# Patient Record
Sex: FEMALE | Race: WHITE | Employment: FULL TIME | URBAN - METROPOLITAN AREA
[De-identification: names, ages, dates, MRNs, and addresses within clinical notes are randomized per-mention and may not be internally consistent; named-entity substitution may affect disease eponyms.]

---

## 2017-03-22 RX ORDER — FLUOXETINE HYDROCHLORIDE 20 MG/1
20 CAPSULE ORAL DAILY
COMMUNITY

## 2017-03-29 ENCOUNTER — HOSPITAL ENCOUNTER (OUTPATIENT)
Dept: PREADMISSION TESTING | Age: 52
Discharge: HOME | End: 2017-03-29
Payer: COMMERCIAL

## 2017-03-29 LAB
ABO + RH BLD: NORMAL
ALBUMIN SERPL BCP-MCNC: 3.7 G/DL (ref 3.4–5)
ALBUMIN/GLOB SERPL: 1.2 {RATIO} (ref 0.8–1.7)
ALP SERPL-CCNC: 63 U/L (ref 45–117)
ALT SERPL-CCNC: 13 U/L (ref 13–56)
ANION GAP BLD CALC-SCNC: 7 MMOL/L (ref 3–18)
AST SERPL W P-5'-P-CCNC: 11 U/L (ref 15–37)
BASOPHILS # BLD AUTO: 0 K/UL (ref 0–0.1)
BASOPHILS # BLD: 0 % (ref 0–2)
BILIRUB SERPL-MCNC: 0.3 MG/DL (ref 0.2–1)
BLOOD GROUP ANTIBODIES SERPL: NORMAL
BUN SERPL-MCNC: 20 MG/DL (ref 7–18)
BUN/CREAT SERPL: 30 (ref 12–20)
CALCIUM SERPL-MCNC: 9.4 MG/DL (ref 8.5–10.1)
CHLORIDE SERPL-SCNC: 104 MMOL/L (ref 100–108)
CO2 SERPL-SCNC: 29 MMOL/L (ref 21–32)
CREAT SERPL-MCNC: 0.67 MG/DL (ref 0.6–1.3)
DIFFERENTIAL METHOD BLD: NORMAL
EOSINOPHIL # BLD: 0.1 K/UL (ref 0–0.4)
EOSINOPHIL NFR BLD: 3 % (ref 0–5)
ERYTHROCYTE [DISTWIDTH] IN BLOOD BY AUTOMATED COUNT: 12.5 % (ref 11.6–14.5)
GLOBULIN SER CALC-MCNC: 3 G/DL (ref 2–4)
GLUCOSE SERPL-MCNC: 79 MG/DL (ref 74–99)
HCG SERPL-ACNC: 2 MIU/ML (ref 0–10)
HCT VFR BLD AUTO: 40.8 % (ref 35–45)
HGB BLD-MCNC: 13 G/DL (ref 12–16)
LYMPHOCYTES # BLD AUTO: 35 % (ref 21–52)
LYMPHOCYTES # BLD: 1.8 K/UL (ref 0.9–3.6)
MCH RBC QN AUTO: 29.5 PG (ref 24–34)
MCHC RBC AUTO-ENTMCNC: 31.9 G/DL (ref 31–37)
MCV RBC AUTO: 92.7 FL (ref 74–97)
MONOCYTES # BLD: 0.5 K/UL (ref 0.05–1.2)
MONOCYTES NFR BLD AUTO: 10 % (ref 3–10)
NEUTS SEG # BLD: 2.7 K/UL (ref 1.8–8)
NEUTS SEG NFR BLD AUTO: 52 % (ref 40–73)
PLATELET # BLD AUTO: 191 K/UL (ref 135–420)
PMV BLD AUTO: 11.7 FL (ref 9.2–11.8)
POTASSIUM SERPL-SCNC: 4.2 MMOL/L (ref 3.5–5.5)
PROT SERPL-MCNC: 6.7 G/DL (ref 6.4–8.2)
RBC # BLD AUTO: 4.4 M/UL (ref 4.2–5.3)
SODIUM SERPL-SCNC: 140 MMOL/L (ref 136–145)
SPECIMEN EXP DATE BLD: NORMAL
WBC # BLD AUTO: 5.1 K/UL (ref 4.6–13.2)

## 2017-03-29 PROCEDURE — 86900 BLOOD TYPING SEROLOGIC ABO: CPT | Performed by: OBSTETRICS & GYNECOLOGY

## 2017-03-29 PROCEDURE — 84702 CHORIONIC GONADOTROPIN TEST: CPT | Performed by: OBSTETRICS & GYNECOLOGY

## 2017-03-29 PROCEDURE — 80053 COMPREHEN METABOLIC PANEL: CPT | Performed by: OBSTETRICS & GYNECOLOGY

## 2017-03-29 PROCEDURE — 85025 COMPLETE CBC W/AUTO DIFF WBC: CPT | Performed by: OBSTETRICS & GYNECOLOGY

## 2017-03-29 PROCEDURE — 36415 COLL VENOUS BLD VENIPUNCTURE: CPT | Performed by: OBSTETRICS & GYNECOLOGY

## 2017-04-03 ENCOUNTER — ANESTHESIA EVENT (OUTPATIENT)
Dept: SURGERY | Age: 52
End: 2017-04-03
Payer: COMMERCIAL

## 2017-04-03 NOTE — H&P
Gynecology History and Physical    Name: Sudhakar Hopson MRN: [id number] [id number]: [id number]    YOB: 1965  Age: 46 y.o. Sex: female       Subjective:      Chief complaint:   RLQ pain    Torrey Bryant is a 46 y.o.  female with a history of RLQ pain. Her US done on 17 reveals normal uterus and EM stripe at 3.8mm.  2.7 cm simple left ovarian cyst, 1.5 cm nabothian cyst and a 1 cm vascular lesion anterior to the uterus. This most likely represents pelvic congestion syndrome. . Previous treatment measures included laparoscopy. She is admitted for Procedure(s) (LRB):  TOTAL LAPAROSCOPIC HYSTERECTOMY/BILATERAL SALPINGECTOMY (N/A). OB History     No data available        Past Medical History:   Diagnosis Date    Asthma     Cancer ([facility] [address]     skin ca removed    Fibromyalgia     GERD (gastroesophageal reflux disease)     Hypothyroid     Mitral valve prolapse     Psychiatric disorder     depression     Past Surgical History:   Procedure Laterality Date    HX APPENDECTOMY      HX GYN       x2    HX OTHER SURGICAL      lysis adhesions    HX OTHER SURGICAL  2009    ablation of uterus     Social History     Occupational History    Not on file. Social History Main Topics    Smoking status: Never Smoker    Smokeless tobacco: Not on file    Alcohol use Yes      Comment: occassionally    Drug use: No    Sexual activity: Not on file     Family History   Problem Relation Age of Onset    Cancer Sister     Cancer Maternal Aunt     Cancer Maternal Grandmother     Cancer Paternal Grandmother         Allergies   Allergen Reactions    Codeine Hives and Nausea Only    Sulfa (Sulfonamide Antibiotics) Hives     Prior to Admission medications    Medication Sig Start Date End Date Taking? Authorizing Provider   LEVOTHYROXINE SODIUM (LEVOTHYROXINE PO) Take  by mouth daily. Yes Historical Provider   FLUoxetine (PROZAC) 20 mg capsule Take 20 mg by mouth daily.    Yes Historical Provider traZODone (DESYREL) 50 mg tablet Take  by mouth nightly. Yes Historical Provider   ranitidine (ZANTAC) 150 mg tablet Take 150 mg by mouth daily. Yes Historical Provider        Review of Systems:  A comprehensive review of systems was negative. Objective:     Vitals:    03/22/17 1438   Weight: 81.6 kg (180 lb)   Height: 5' 3\" (1.6 m)       Physical Exam:  Heart: Regular rate and rhythm or without murmur or extra heart sounds  Lung: clear to auscultation throughout lung fields, no wheezes, no rales, no rhonchi and normal respiratory effort  Abdomen: soft, nontender  External Genitalia: normal general appearance  Vagina: normal mucosa without prolapse or lesions  Cervix: normal appearance  Adnexa: normal bimanual exam  Uterus: normal single, nontender    Assessment:     Chronic RLQ pain with vascular lesion anterior to the uterus     Plan:     Procedure(s) (LRB):  TOTAL LAPAROSCOPIC HYSTERECTOMY/BILATERAL SALPINGECTOMY (N/A)  Discussed the risks of surgery including the risks of bleeding, infection, deep vein thrombosis, and surgical injuries to internal organs including but not limited to the bowels, bladder, rectum, and female reproductive organs. The patient understands the risks; any and all questions were answered to the patient's satisfaction.     Signed By:  Yris Roe MD     April 3, 2017

## 2017-04-04 ENCOUNTER — ANESTHESIA (OUTPATIENT)
Dept: SURGERY | Age: 52
End: 2017-04-04
Payer: COMMERCIAL

## 2017-04-04 ENCOUNTER — HOSPITAL ENCOUNTER (OUTPATIENT)
Age: 52
Setting detail: OBSERVATION
Discharge: HOME | End: 2017-04-04
Attending: OBSTETRICS & GYNECOLOGY | Admitting: OBSTETRICS & GYNECOLOGY
Payer: COMMERCIAL

## 2017-04-04 ENCOUNTER — SURGERY (OUTPATIENT)
Age: 52
End: 2017-04-04

## 2017-04-04 VITALS
TEMPERATURE: 98.3 F | DIASTOLIC BLOOD PRESSURE: 78 MMHG | SYSTOLIC BLOOD PRESSURE: 119 MMHG | RESPIRATION RATE: 16 BRPM | HEART RATE: 85 BPM | HEIGHT: 63 IN | BODY MASS INDEX: 31.01 KG/M2 | WEIGHT: 175 LBS | OXYGEN SATURATION: 98 %

## 2017-04-04 LAB — HCG UR QL: NEGATIVE

## 2017-04-04 PROCEDURE — 77030002882 HC SUT CART KNOT LSIS -B: Performed by: OBSTETRICS & GYNECOLOGY

## 2017-04-04 PROCEDURE — 99218 HC RM OBSERVATION: CPT

## 2017-04-04 PROCEDURE — 77030034850: Performed by: OBSTETRICS & GYNECOLOGY

## 2017-04-04 PROCEDURE — 77030013288 HC BLN OCCL PERITNM COOP -B: Performed by: OBSTETRICS & GYNECOLOGY

## 2017-04-04 PROCEDURE — 77030011640 HC PAD GRND REM COVD -A: Performed by: OBSTETRICS & GYNECOLOGY

## 2017-04-04 PROCEDURE — 77030008023 HC RELD SUT ENDOSC COVD -B: Performed by: OBSTETRICS & GYNECOLOGY

## 2017-04-04 PROCEDURE — 74011000250 HC RX REV CODE- 250: Performed by: OBSTETRICS & GYNECOLOGY

## 2017-04-04 PROCEDURE — 77030035029 HC NDL INSUF VERES DISP COVD -B: Performed by: OBSTETRICS & GYNECOLOGY

## 2017-04-04 PROCEDURE — 76010000131 HC OR TIME 2 TO 2.5 HR: Performed by: OBSTETRICS & GYNECOLOGY

## 2017-04-04 PROCEDURE — 77030009957 HC RELD ENDOSTCH COVD -C: Performed by: OBSTETRICS & GYNECOLOGY

## 2017-04-04 PROCEDURE — 74011250636 HC RX REV CODE- 250/636

## 2017-04-04 PROCEDURE — 77030034628 HC LIGASURE LAP SEAL DIV MD COVD -F: Performed by: OBSTETRICS & GYNECOLOGY

## 2017-04-04 PROCEDURE — 74011000272 HC RX REV CODE- 272: Performed by: OBSTETRICS & GYNECOLOGY

## 2017-04-04 PROCEDURE — 77030010031 HC SCIS ENDOSC MPLR J&J -C: Performed by: OBSTETRICS & GYNECOLOGY

## 2017-04-04 PROCEDURE — 77030002993 HC SUT RELD STP LSIS -B: Performed by: OBSTETRICS & GYNECOLOGY

## 2017-04-04 PROCEDURE — 77030032490 HC SLV COMPR SCD KNE COVD -B: Performed by: OBSTETRICS & GYNECOLOGY

## 2017-04-04 PROCEDURE — 77030002933 HC SUT MCRYL J&J -A: Performed by: OBSTETRICS & GYNECOLOGY

## 2017-04-04 PROCEDURE — 74011250636 HC RX REV CODE- 250/636: Performed by: NURSE ANESTHETIST, CERTIFIED REGISTERED

## 2017-04-04 PROCEDURE — 77030011220 HC DEV ELECSURG COVD -B: Performed by: OBSTETRICS & GYNECOLOGY

## 2017-04-04 PROCEDURE — 88307 TISSUE EXAM BY PATHOLOGIST: CPT | Performed by: OBSTETRICS & GYNECOLOGY

## 2017-04-04 PROCEDURE — 77030018836 HC SOL IRR NACL ICUM -A: Performed by: OBSTETRICS & GYNECOLOGY

## 2017-04-04 PROCEDURE — 77030003679 HC NDL SPN TERU -A: Performed by: OBSTETRICS & GYNECOLOGY

## 2017-04-04 PROCEDURE — 77030009848 HC PASSR SUT SET COOP -C: Performed by: OBSTETRICS & GYNECOLOGY

## 2017-04-04 PROCEDURE — 76210000016 HC OR PH I REC 1 TO 1.5 HR: Performed by: OBSTETRICS & GYNECOLOGY

## 2017-04-04 PROCEDURE — 77030018834: Performed by: OBSTETRICS & GYNECOLOGY

## 2017-04-04 PROCEDURE — 74011250636 HC RX REV CODE- 250/636: Performed by: OBSTETRICS & GYNECOLOGY

## 2017-04-04 PROCEDURE — 74011250637 HC RX REV CODE- 250/637: Performed by: OBSTETRICS & GYNECOLOGY

## 2017-04-04 PROCEDURE — 77030035045 HC TRCR ENDOSC VRSPRT BLDLSS COVD -B: Performed by: OBSTETRICS & GYNECOLOGY

## 2017-04-04 PROCEDURE — 77030008756 HC TU IRR SUC STRY -B: Performed by: OBSTETRICS & GYNECOLOGY

## 2017-04-04 PROCEDURE — 77030020053 HC ELECTRD LAPSCP COVD -B: Performed by: OBSTETRICS & GYNECOLOGY

## 2017-04-04 PROCEDURE — 81025 URINE PREGNANCY TEST: CPT

## 2017-04-04 PROCEDURE — 74011000250 HC RX REV CODE- 250

## 2017-04-04 PROCEDURE — 76060000035 HC ANESTHESIA 2 TO 2.5 HR: Performed by: OBSTETRICS & GYNECOLOGY

## 2017-04-04 PROCEDURE — 77030003666 HC NDL SPINAL BD -A: Performed by: OBSTETRICS & GYNECOLOGY

## 2017-04-04 PROCEDURE — 77030020782 HC GWN BAIR PAWS FLX 3M -B: Performed by: OBSTETRICS & GYNECOLOGY

## 2017-04-04 PROCEDURE — 77030032230 HC DSCTR ULTSONC CRDLSS COVD -E: Performed by: OBSTETRICS & GYNECOLOGY

## 2017-04-04 PROCEDURE — 77030031139 HC SUT VCRL2 J&J -A: Performed by: OBSTETRICS & GYNECOLOGY

## 2017-04-04 PROCEDURE — 77030014063 HC TIP MANIP UTER COOP -B: Performed by: OBSTETRICS & GYNECOLOGY

## 2017-04-04 PROCEDURE — 77030027138 HC INCENT SPIROMETER -A

## 2017-04-04 PROCEDURE — 74011250637 HC RX REV CODE- 250/637: Performed by: NURSE ANESTHETIST, CERTIFIED REGISTERED

## 2017-04-04 PROCEDURE — 77030002903 HC SUT DEV PLCMNT LSIS -C: Performed by: OBSTETRICS & GYNECOLOGY

## 2017-04-04 RX ORDER — SODIUM CHLORIDE 0.9 % (FLUSH) 0.9 %
5-10 SYRINGE (ML) INJECTION EVERY 8 HOURS
Status: DISCONTINUED | OUTPATIENT
Start: 2017-04-04 | End: 2017-04-04 | Stop reason: SDUPTHER

## 2017-04-04 RX ORDER — MIDAZOLAM HYDROCHLORIDE 1 MG/ML
INJECTION, SOLUTION INTRAMUSCULAR; INTRAVENOUS AS NEEDED
Status: DISCONTINUED | OUTPATIENT
Start: 2017-04-04 | End: 2017-04-04 | Stop reason: HOSPADM

## 2017-04-04 RX ORDER — HYDROMORPHONE HYDROCHLORIDE 2 MG/ML
1 INJECTION, SOLUTION INTRAMUSCULAR; INTRAVENOUS; SUBCUTANEOUS
Status: DISCONTINUED | OUTPATIENT
Start: 2017-04-04 | End: 2017-04-04 | Stop reason: HOSPADM

## 2017-04-04 RX ORDER — BUPIVACAINE HYDROCHLORIDE AND EPINEPHRINE 2.5; 5 MG/ML; UG/ML
INJECTION, SOLUTION EPIDURAL; INFILTRATION; INTRACAUDAL; PERINEURAL AS NEEDED
Status: DISCONTINUED | OUTPATIENT
Start: 2017-04-04 | End: 2017-04-04 | Stop reason: HOSPADM

## 2017-04-04 RX ORDER — ONDANSETRON 2 MG/ML
INJECTION INTRAMUSCULAR; INTRAVENOUS AS NEEDED
Status: DISCONTINUED | OUTPATIENT
Start: 2017-04-04 | End: 2017-04-04 | Stop reason: HOSPADM

## 2017-04-04 RX ORDER — SODIUM CHLORIDE 0.9 % (FLUSH) 0.9 %
5-10 SYRINGE (ML) INJECTION AS NEEDED
Status: DISCONTINUED | OUTPATIENT
Start: 2017-04-04 | End: 2017-04-04 | Stop reason: SDUPTHER

## 2017-04-04 RX ORDER — DIPHENHYDRAMINE HCL 25 MG
25 CAPSULE ORAL
Status: DISCONTINUED | OUTPATIENT
Start: 2017-04-04 | End: 2017-04-04 | Stop reason: HOSPADM

## 2017-04-04 RX ORDER — SODIUM CHLORIDE, SODIUM LACTATE, POTASSIUM CHLORIDE, CALCIUM CHLORIDE 600; 310; 30; 20 MG/100ML; MG/100ML; MG/100ML; MG/100ML
50 INJECTION, SOLUTION INTRAVENOUS CONTINUOUS
Status: DISCONTINUED | OUTPATIENT
Start: 2017-04-04 | End: 2017-04-04 | Stop reason: SDUPTHER

## 2017-04-04 RX ORDER — PROPOFOL 10 MG/ML
INJECTION, EMULSION INTRAVENOUS AS NEEDED
Status: DISCONTINUED | OUTPATIENT
Start: 2017-04-04 | End: 2017-04-04 | Stop reason: HOSPADM

## 2017-04-04 RX ORDER — OXYCODONE AND ACETAMINOPHEN 5; 325 MG/1; MG/1
1 TABLET ORAL
Status: DISCONTINUED | OUTPATIENT
Start: 2017-04-04 | End: 2017-04-04 | Stop reason: HOSPADM

## 2017-04-04 RX ORDER — FAMOTIDINE 20 MG/1
20 TABLET, FILM COATED ORAL ONCE
Status: DISCONTINUED | OUTPATIENT
Start: 2017-04-04 | End: 2017-04-04 | Stop reason: SDUPTHER

## 2017-04-04 RX ORDER — LIDOCAINE HYDROCHLORIDE 10 MG/ML
0.1 INJECTION, SOLUTION EPIDURAL; INFILTRATION; INTRACAUDAL; PERINEURAL AS NEEDED
Status: DISCONTINUED | OUTPATIENT
Start: 2017-04-04 | End: 2017-04-04 | Stop reason: SDUPTHER

## 2017-04-04 RX ORDER — SODIUM CHLORIDE 0.9 % (FLUSH) 0.9 %
5-10 SYRINGE (ML) INJECTION AS NEEDED
Status: DISCONTINUED | OUTPATIENT
Start: 2017-04-04 | End: 2017-04-04 | Stop reason: HOSPADM

## 2017-04-04 RX ORDER — DEXAMETHASONE SODIUM PHOSPHATE 4 MG/ML
INJECTION, SOLUTION INTRA-ARTICULAR; INTRALESIONAL; INTRAMUSCULAR; INTRAVENOUS; SOFT TISSUE AS NEEDED
Status: DISCONTINUED | OUTPATIENT
Start: 2017-04-04 | End: 2017-04-04 | Stop reason: HOSPADM

## 2017-04-04 RX ORDER — FAMOTIDINE 20 MG/1
20 TABLET, FILM COATED ORAL ONCE
Status: COMPLETED | OUTPATIENT
Start: 2017-04-04 | End: 2017-04-04

## 2017-04-04 RX ORDER — SODIUM CHLORIDE, SODIUM LACTATE, POTASSIUM CHLORIDE, CALCIUM CHLORIDE 600; 310; 30; 20 MG/100ML; MG/100ML; MG/100ML; MG/100ML
125 INJECTION, SOLUTION INTRAVENOUS CONTINUOUS
Status: DISCONTINUED | OUTPATIENT
Start: 2017-04-04 | End: 2017-04-04 | Stop reason: HOSPADM

## 2017-04-04 RX ORDER — ONDANSETRON 2 MG/ML
4 INJECTION INTRAMUSCULAR; INTRAVENOUS ONCE
Status: COMPLETED | OUTPATIENT
Start: 2017-04-04 | End: 2017-04-04

## 2017-04-04 RX ORDER — SODIUM CHLORIDE 0.9 % (FLUSH) 0.9 %
5-10 SYRINGE (ML) INJECTION EVERY 8 HOURS
Status: DISCONTINUED | OUTPATIENT
Start: 2017-04-04 | End: 2017-04-04 | Stop reason: HOSPADM

## 2017-04-04 RX ORDER — DIPHENHYDRAMINE HYDROCHLORIDE 50 MG/ML
12.5 INJECTION, SOLUTION INTRAMUSCULAR; INTRAVENOUS
Status: DISCONTINUED | OUTPATIENT
Start: 2017-04-04 | End: 2017-04-04 | Stop reason: HOSPADM

## 2017-04-04 RX ORDER — IBUPROFEN 800 MG/1
800 TABLET ORAL
Qty: 60 TAB | Refills: 0 | Status: SHIPPED
Start: 2017-04-04 | End: 2018-07-27 | Stop reason: SDUPTHER

## 2017-04-04 RX ORDER — OXYCODONE AND ACETAMINOPHEN 5; 325 MG/1; MG/1
1 TABLET ORAL
Qty: 20 TAB | Refills: 0 | Status: SHIPPED
Start: 2017-04-04 | End: 2018-08-22

## 2017-04-04 RX ORDER — FENTANYL CITRATE 50 UG/ML
INJECTION, SOLUTION INTRAMUSCULAR; INTRAVENOUS AS NEEDED
Status: DISCONTINUED | OUTPATIENT
Start: 2017-04-04 | End: 2017-04-04 | Stop reason: HOSPADM

## 2017-04-04 RX ORDER — MORPHINE SULFATE 2 MG/ML
2 INJECTION, SOLUTION INTRAMUSCULAR; INTRAVENOUS
Status: DISCONTINUED | OUTPATIENT
Start: 2017-04-04 | End: 2017-04-04 | Stop reason: HOSPADM

## 2017-04-04 RX ORDER — KETOROLAC TROMETHAMINE 30 MG/ML
30 INJECTION, SOLUTION INTRAMUSCULAR; INTRAVENOUS
Status: COMPLETED | OUTPATIENT
Start: 2017-04-04 | End: 2017-04-04

## 2017-04-04 RX ORDER — DEXTROSE 50 % IN WATER (D50W) INTRAVENOUS SYRINGE
25-50 AS NEEDED
Status: DISCONTINUED | OUTPATIENT
Start: 2017-04-04 | End: 2017-04-04 | Stop reason: HOSPADM

## 2017-04-04 RX ORDER — GLYCOPYRROLATE 0.2 MG/ML
INJECTION INTRAMUSCULAR; INTRAVENOUS AS NEEDED
Status: DISCONTINUED | OUTPATIENT
Start: 2017-04-04 | End: 2017-04-04 | Stop reason: HOSPADM

## 2017-04-04 RX ORDER — CEFAZOLIN SODIUM 2 G/50ML
2 SOLUTION INTRAVENOUS ONCE
Status: COMPLETED | OUTPATIENT
Start: 2017-04-04 | End: 2017-04-04

## 2017-04-04 RX ORDER — IBUPROFEN 400 MG/1
800 TABLET ORAL
Status: DISCONTINUED | OUTPATIENT
Start: 2017-04-04 | End: 2017-04-04 | Stop reason: HOSPADM

## 2017-04-04 RX ORDER — ONDANSETRON 2 MG/ML
4 INJECTION INTRAMUSCULAR; INTRAVENOUS
Status: DISCONTINUED | OUTPATIENT
Start: 2017-04-04 | End: 2017-04-04 | Stop reason: HOSPADM

## 2017-04-04 RX ORDER — INSULIN LISPRO 100 [IU]/ML
INJECTION, SOLUTION INTRAVENOUS; SUBCUTANEOUS ONCE
Status: DISCONTINUED | OUTPATIENT
Start: 2017-04-04 | End: 2017-04-04 | Stop reason: HOSPADM

## 2017-04-04 RX ORDER — LIDOCAINE HYDROCHLORIDE 10 MG/ML
0.1 INJECTION, SOLUTION EPIDURAL; INFILTRATION; INTRACAUDAL; PERINEURAL AS NEEDED
Status: DISCONTINUED | OUTPATIENT
Start: 2017-04-04 | End: 2017-04-04 | Stop reason: HOSPADM

## 2017-04-04 RX ORDER — NALOXONE HYDROCHLORIDE 0.4 MG/ML
0.4 INJECTION, SOLUTION INTRAMUSCULAR; INTRAVENOUS; SUBCUTANEOUS AS NEEDED
Status: DISCONTINUED | OUTPATIENT
Start: 2017-04-04 | End: 2017-04-04 | Stop reason: HOSPADM

## 2017-04-04 RX ORDER — NEOSTIGMINE METHYLSULFATE 5 MG/5 ML
SYRINGE (ML) INTRAVENOUS AS NEEDED
Status: DISCONTINUED | OUTPATIENT
Start: 2017-04-04 | End: 2017-04-04 | Stop reason: HOSPADM

## 2017-04-04 RX ORDER — SUCCINYLCHOLINE CHLORIDE 20 MG/ML
INJECTION INTRAMUSCULAR; INTRAVENOUS AS NEEDED
Status: DISCONTINUED | OUTPATIENT
Start: 2017-04-04 | End: 2017-04-04 | Stop reason: HOSPADM

## 2017-04-04 RX ORDER — SODIUM CHLORIDE, SODIUM LACTATE, POTASSIUM CHLORIDE, CALCIUM CHLORIDE 600; 310; 30; 20 MG/100ML; MG/100ML; MG/100ML; MG/100ML
50 INJECTION, SOLUTION INTRAVENOUS CONTINUOUS
Status: DISCONTINUED | OUTPATIENT
Start: 2017-04-04 | End: 2017-04-04 | Stop reason: HOSPADM

## 2017-04-04 RX ORDER — ROCURONIUM BROMIDE 10 MG/ML
INJECTION, SOLUTION INTRAVENOUS AS NEEDED
Status: DISCONTINUED | OUTPATIENT
Start: 2017-04-04 | End: 2017-04-04 | Stop reason: HOSPADM

## 2017-04-04 RX ORDER — MAGNESIUM SULFATE 100 %
4 CRYSTALS MISCELLANEOUS AS NEEDED
Status: DISCONTINUED | OUTPATIENT
Start: 2017-04-04 | End: 2017-04-04 | Stop reason: HOSPADM

## 2017-04-04 RX ORDER — KETOROLAC TROMETHAMINE 30 MG/ML
30 INJECTION, SOLUTION INTRAMUSCULAR; INTRAVENOUS
Status: DISCONTINUED | OUTPATIENT
Start: 2017-04-04 | End: 2017-04-04 | Stop reason: HOSPADM

## 2017-04-04 RX ADMIN — GLYCOPYRROLATE 0.8 MG: 0.2 INJECTION INTRAMUSCULAR; INTRAVENOUS at 09:27

## 2017-04-04 RX ADMIN — SODIUM CHLORIDE 500 ML: 900 IRRIGANT IRRIGATION at 08:12

## 2017-04-04 RX ADMIN — MIDAZOLAM HYDROCHLORIDE 2 MG: 1 INJECTION, SOLUTION INTRAMUSCULAR; INTRAVENOUS at 07:33

## 2017-04-04 RX ADMIN — FENTANYL CITRATE 50 MCG: 50 INJECTION, SOLUTION INTRAMUSCULAR; INTRAVENOUS at 07:54

## 2017-04-04 RX ADMIN — Medication 2 MG: at 12:18

## 2017-04-04 RX ADMIN — SUCCINYLCHOLINE CHLORIDE 100 MG: 20 INJECTION INTRAMUSCULAR; INTRAVENOUS at 07:39

## 2017-04-04 RX ADMIN — MEPERIDINE HYDROCHLORIDE 12.5 MG: 25 INJECTION INTRAMUSCULAR; INTRAVENOUS; SUBCUTANEOUS at 09:56

## 2017-04-04 RX ADMIN — CEFAZOLIN SODIUM 2 G: 2 SOLUTION INTRAVENOUS at 07:38

## 2017-04-04 RX ADMIN — FENTANYL CITRATE 50 MCG: 50 INJECTION, SOLUTION INTRAMUSCULAR; INTRAVENOUS at 07:38

## 2017-04-04 RX ADMIN — KETOROLAC TROMETHAMINE 30 MG: 30 INJECTION, SOLUTION INTRAMUSCULAR at 09:56

## 2017-04-04 RX ADMIN — PROPOFOL 150 MG: 10 INJECTION, EMULSION INTRAVENOUS at 07:38

## 2017-04-04 RX ADMIN — BUPIVACAINE HYDROCHLORIDE AND EPINEPHRINE BITARTRATE 30 ML: 2.5; .0091 INJECTION, SOLUTION EPIDURAL; INFILTRATION; INTRACAUDAL; PERINEURAL at 08:11

## 2017-04-04 RX ADMIN — ROCURONIUM BROMIDE 10 MG: 10 INJECTION, SOLUTION INTRAVENOUS at 07:52

## 2017-04-04 RX ADMIN — FENTANYL CITRATE 50 MCG: 50 INJECTION, SOLUTION INTRAMUSCULAR; INTRAVENOUS at 09:04

## 2017-04-04 RX ADMIN — IBUPROFEN 800 MG: 400 TABLET, FILM COATED ORAL at 16:11

## 2017-04-04 RX ADMIN — ONDANSETRON 4 MG: 2 INJECTION INTRAMUSCULAR; INTRAVENOUS at 09:56

## 2017-04-04 RX ADMIN — ROCURONIUM BROMIDE 10 MG: 10 INJECTION, SOLUTION INTRAVENOUS at 08:21

## 2017-04-04 RX ADMIN — ROCURONIUM BROMIDE 10 MG: 10 INJECTION, SOLUTION INTRAVENOUS at 08:45

## 2017-04-04 RX ADMIN — Medication 4 MG: at 09:27

## 2017-04-04 RX ADMIN — FENTANYL CITRATE 50 MCG: 50 INJECTION, SOLUTION INTRAMUSCULAR; INTRAVENOUS at 08:46

## 2017-04-04 RX ADMIN — SODIUM CHLORIDE, SODIUM LACTATE, POTASSIUM CHLORIDE, AND CALCIUM CHLORIDE 50 ML/HR: 600; 310; 30; 20 INJECTION, SOLUTION INTRAVENOUS at 07:09

## 2017-04-04 RX ADMIN — ONDANSETRON 4 MG: 2 INJECTION INTRAMUSCULAR; INTRAVENOUS at 09:18

## 2017-04-04 RX ADMIN — DEXAMETHASONE SODIUM PHOSPHATE 4 MG: 4 INJECTION, SOLUTION INTRA-ARTICULAR; INTRALESIONAL; INTRAMUSCULAR; INTRAVENOUS; SOFT TISSUE at 07:42

## 2017-04-04 RX ADMIN — ROCURONIUM BROMIDE 20 MG: 10 INJECTION, SOLUTION INTRAVENOUS at 07:45

## 2017-04-04 RX ADMIN — OXYCODONE HYDROCHLORIDE AND ACETAMINOPHEN 1 TABLET: 5; 325 TABLET ORAL at 14:38

## 2017-04-04 RX ADMIN — FAMOTIDINE 20 MG: 20 TABLET, FILM COATED ORAL at 06:56

## 2017-04-04 RX ADMIN — HYDROMORPHONE HYDROCHLORIDE 1 MG: 2 INJECTION, SOLUTION INTRAMUSCULAR; INTRAVENOUS; SUBCUTANEOUS at 10:18

## 2017-04-04 NOTE — DISCHARGE INSTRUCTIONS
GYN Discharge Instructions    Physical Activity  Frequent rest periods  No sex, douches, tampons, deep tub baths, pools or hot tubs for 6 weeks. No heavy lifting, pulling or pushing for 6 weeks  No driving for 2 weeks    Call your doctor if:  You have pain, swelling, or redness in your calf or legs  You have excessive pain not controlled by pain meds. You have a temperature over 101 degrees  You saturate a sanitary napkin in less than 4 hours  You have any foul smelling drainage  You have frequency, pain or burning when you urinate; problems urinating or having a BM. Your incision has , drains or bleeds  You have any severe abdominal, back or chest pain. Diet  As tolerated    Follow up with your doctor in 2 weeks      DISCHARGE SUMMARY from Nurse    The following personal items collected during your admission are returned to you:   Dental Appliance: Dental Appliances: None  Vision: Visual Aid: None  Hearing Aid:    Jewelry: Jewelry: None  Clothing: Clothing: Jacket/Coat, Pants, Shirt, Socks, Undergarments  Other Valuables: Other Valuables: None  Valuables sent to safe:                *  Please give a list of your current medications to your Primary Care Provider. *  Please update this list whenever your medications are discontinued, doses are      changed, or new medications (including over-the-counter products) are added. *  Please carry medication information at all times in case of emergency situations. These are general instructions for a healthy lifestyle:    No smoking/ No tobacco products/ Avoid exposure to second hand smoke    Surgeon General's Warning:  Quitting smoking now greatly reduces serious risk to your health.     Obesity, smoking, and sedentary lifestyle greatly increases your risk for illness    A healthy diet, regular physical exercise & weight monitoring are important for maintaining a healthy lifestyle    You may be retaining fluid if you have a history of heart failure or if you experience any of the following symptoms:  Weight gain of 3 pounds or more overnight or 5 pounds in a week, increased swelling in our hands or feet or shortness of breath while lying flat in bed. Please call your doctor as soon as you notice any of these symptoms; do not wait until your next office visit. Recognize signs and symptoms of STROKE:    F-face looks uneven    A-arms unable to move or move unevenly    S-speech slurred or non-existent    T-time-call 911 as soon as signs and symptoms begin-DO NOT go       Back to bed or wait to see if you get better-TIME IS BRAIN. The discharge information has been reviewed with the patient. The patient verbalized understanding. Patient armband removed and shredded    MyChart Activation    Thank you for requesting access to [employer]. Please follow the instructions below to securely access and download your online medical record. [employer] allows you to send messages to your doctor, view your test results, renew your prescriptions, schedule appointments, and more. How Do I Sign Up? 1. In your internet browser, go to [email or web address]. 2. Click on the First Time User? Click Here link in the Sign In box. You will see the New Member Sign Up page. 3. Enter your [employer] Access Code exactly as it appears below. You will not need to use this code after youve completed the sign-up process. If you do not sign up before the expiration date, you must request a new code. [employer] Access Code: [email or web address]  Expires: 2017  4:53 PM (This is the date your [employer] access code will )    4. Enter the last four digits of your Social Security Number (xxxx) and Date of Birth (mm/dd/yyyy) as indicated and click Submit. You will be taken to the next sign-up page. 5. Create a [facility] [facility] ID. This will be your [facility] login ID and cannot be changed, so think of one that is secure and easy to remember. 6. Create a [facility] password. You can change your password at any time. 7. Enter your Password Reset Question and Answer. This can be used at a later time if you forget your password. 8. Enter your e-mail address. You will receive e-mail notification when new information is available in [address] 9. Click Sign Up. You can now view and download portions of your medical record. 10. Click the Download Summary menu link to download a portable copy of your medical information. Additional Information    If you have questions, please visit the Frequently Asked Questions section of the [employer] website at [email or web address] Remember, [employer] is NOT to be used for urgent needs. For medical emergencies, dial 911.

## 2017-04-04 NOTE — PROGRESS NOTES
conducted a pre-surgery visit with Maribel Arceo, who is a 46 y.o.,female. The  provided the following Interventions:  Initiated a relationship of care and support. Plan:  Chaplains will continue to follow and will provide pastoral care on an as needed/requested basis.  recommends bedside caregivers page  on duty if patient shows signs of acute spiritual or emotional distress.     [facility] Certified [address] [place] [address]   [phone]

## 2017-04-04 NOTE — PROGRESS NOTES
SHIFT SUMMARY NOTE [id number]:    1423: TRANSFER - IN REPORT:    Verbal report received from  Silvano Natarajan(name) on [employer]  being received from [employer]) for routine progression of care      Report consisted of patients Situation, Background, Assessment and   Recommendations(SBAR). Information from the following report(s) SBAR was reviewed with the receiving nurse. Opportunity for questions and clarification was provided. Assessment completed upon patients arrival to unit and care assumed. 1120: Received to 2213 via bed from PACU. Initial assessment completed. Oriented to room, policies, call bell, TV, phone, bed controls, emergency light in bathroom, expected progression of care. 1135: Assisted OOB to bathroom. 1150: Back in bed, unable to void at this time. 1218: C/O pain 5/10 and medicated with IV Morphine. 1230: Patient c/o inflating bed causing pain. Bed swapped with bed that has stationary mattress. 1315: Resting in bed, pain acceptable @ 3/10.    1415: Assisted OOB to bathroom. 1438: Voided about 50 ml's in specipan, and urine in commode. Wanted to try Percocet for c/o pain 2/10. Crackers given. 1526: Resting in bed. \"Pain tolerable\". Wants to nap.    1611: OOB to bathroom, voided 300 ml's. IV saline-locked. C/O pain 5/10 and medicated with Motrin, jello and crackers given. 1710: Adequate pain relief verbalized. Patient ambulated in hallway. 1745: Saline lock discontinued and bandaid applied. 1800: DC instructions reviewed with patient. She verbalized understanding. DC via W/C in stable condition.

## 2017-04-04 NOTE — ANESTHESIA POSTPROCEDURE EVALUATION
Post-Anesthesia Evaluation and Assessment    Patient: Sudhakar Hopson MRN: [id number]  SSN: [id number]    YOB: 1965  Age: 46 y.o. Sex: female       Cardiovascular Function/Vital Signs  Visit Vitals    /72    Pulse 80    Temp 37 °C (98.6 °F)    Resp 16    Ht 5' 3\" (1.6 m)    Wt 79.4 kg (175 lb)    SpO2 94%    BMI 31 kg/m2       Patient is status post general anesthesia for Procedure(s):  TOTAL LAPAROSCOPIC HYSTERECTOMY/BILATERAL SALPINGECTOMY/LYSIS OF ADHESIONS. Nausea/Vomiting: None    Postoperative hydration reviewed and adequate. Pain:  Pain Scale 1: Numeric (0 - 10) (04/04/17 1055)  Pain Intensity 1: 3 (04/04/17 1055)   Managed    Neurological Status:   Neuro (WDL): Within Defined Limits (04/04/17 0943)   At baseline    Mental Status and Level of Consciousness: Arousable    Pulmonary Status:   O2 Device: Room air (04/04/17 1018)   Adequate oxygenation and airway patent    Complications related to anesthesia: None    Post-anesthesia assessment completed.  No concerns    Signed By: Nolan Andrade MD     April 4, 2017

## 2017-04-04 NOTE — PERIOP NOTES
TRANSFER - OUT REPORT:    Verbal report given to Irma RN(name) on Morelia Blanchard  being transferred to [facility](unit) for routine post - op       Report consisted of patients Situation, Background, Assessment and   Recommendations(SBAR). Information from the following report(s) SBAR, Kardex, OR Summary, Procedure Summary, Intake/Output, MAR and Recent Results was reviewed with the receiving nurse. Lines:   Peripheral IV 04/04/17 Left Hand (Active)   Site Assessment Clean, dry, & intact 4/4/2017  9:36 AM   Phlebitis Assessment 0 4/4/2017  9:36 AM   Infiltration Assessment 0 4/4/2017  9:36 AM   Dressing Status Clean, dry, & intact 4/4/2017  9:36 AM   Dressing Type Tape;Transparent 4/4/2017  9:36 AM   Hub Color/Line Status Pink; Infusing 4/4/2017  9:36 AM        Opportunity for questions and clarification was provided.       Patient transported with:   [employer]

## 2017-04-04 NOTE — OP NOTES
Preop Diagnosis: RLQ pain  Postop Diagnosis: same  Procedure: TLH, bilateral salpingectomy, lysis of adhesions  Surgeon: Alexy Gilliland MD  Assistant: Jerod Howe  Anesthesia: GETA  EBL: 50cc  Findings: 7 wk size uterus, normal tubes and ovaries, significant adhesions of sigmoid to posterior cervix  Specimens: uterus to permanent pathology  Complications: none  Disposition: to the recovery room in stable condition    Procedure: The patient was brought to the operating room where patient identification and surgery identification were completed with the patient and the OR team. The patient was transferred to the OR table and GETA was obtained without difficulty. Both arms were padded and tucked. SCD's were on and activated. The patient was placed in the lithotomy position in adjustable Jus stirrups and prepped and draped in the normal sterile fashion. Patient identification and surgery identification were repeated with the surgeon and the OR team.       A weighted speculum was placed vaginally and the anterior lip of the cervix was grasped with a single toothed tenaculum. The uterus was sounded to 7 cm. Stay sutures of 0-Vicryl were placed at 3 and 9 o'clock. A 6 cm TO tip and a small cup was applied and the TO was inserted without difficulty. 3 cc of NS were used to inflate the intrauterine balloon and 60 cc of NS were used to inflate the vaginal occluder balloon. All other instruments were removed. A arroyo catheter was placed with clear urine noted. Attention was turned to the abdomen where a total of 15 cc of 0.25% Marcaine was injected into the infraumbilical and left and right lower quadrant incision sites. A 10 mm incision was made infraumbilically and a Veress needle was introduced into the abdominal cavity with anterior tenting of the abdominal wall. Pneumoperitoneum was obtained using CO2 gas to a pressure of 15 mm of Hg.  The Veress needle was removed and a 10 mm OptiView port was placed under laparoscopic visualization without complication. The patient was placed in steep [place] and 10 mm incisions were made in the right and left lower quadrants. Bladeless ports were placed under laparoscopic visualization without complication. The pelvis was examined with the above noted findings. The adhesions of the sigmoid to the posterior cervix were taken down with blunt and sharp dissection using the scissors. The sigmoid was carefully inspected and found to be free from injury. Dr Bernadette Ann (General Surgery) also inspected the area of dissection and agreed that there was no injury to the serosa of the sigmoid. The right fallopian tube was grasped and the mesosalpinx was incised with the Sonicision. Excellent hemostasis was noted. That procedure was repeated on the left tube. The left cornual region of the uterus was grasped and the fallopian tube and the utero-ovarian, round and broad ligaments were transected with the Harmonic scalpel. The bladder flap was created across the lower uterine segment. The uterine vessel bundle was skeletonized and transected with the Sonicision. Attention was turned to the right cornual region where the same transections were performed. The TO cup was palpated and the vaginal space was entered using the Ligasure hook. A circumferential incision was then completed using the Ligasure hook to remove all remaining uterine attachments. The uterus was then removed vaginally and a glove covered sponge on a stick was placed into the vagina to maintain pneumoperitoneum. The edges of the vaginal cuff were examined and vaginal cuff closure was then completed using the Endostitch with 0-Vicryl suture. A series of figure of eight sutures were placed for cuff closure after the cuff angles had been secured. The pelvis was copiously irrigated and all fluid was removed. Hemostasis was again noted. The ureters were identified bilaterally with normal peristalsis noted. The lower quadrant ports were removed and hemostasis was noted. Pneumoperitoneum was released and the infraumbilical port and laparoscope were removed. The incisions were closed with subcuticular 3-0 Monocryl suture and steri-strips and bandaids were placed over the incisions. The arroyo catheter was removed at the end of the procedure. Sponge, lap, needle and instrument counts were correct x 2. The patient was transferred to the  extubated, in stable condition.      Roseanna Rollins MD  April 4, 2017

## 2017-04-04 NOTE — IP AVS SNAPSHOT
[facility] 
 
 
 [address] Patient: Sudhakar Hopson MRN: [id number] :1965 You are allergic to the following Allergen Reactions Codeine Hives Nausea Only Sulfa (Sulfonamide Antibiotics) Hives Recent Documentation Height Weight BMI OB Status Smoking Status 1.6 m 79.4 kg 31 kg/m2 Ablation Never Smoker Emergency Contacts Name Discharge Info Relation Home Work Carlos Pabon  Child [2] [phone] Nayeli Cisnerosyn DISCHARGE CAREGIVER [3] Mother [14] [phone] About your hospitalization You were admitted on:  2017 You last received care in the:  [facility] [address] You were discharged on:  2017 Unit phone number:  [phone] Why you were hospitalized Your primary diagnosis was:  Not on File Providers Seen During Your Hospitalizations Provider Role Specialty Primary office phone Brionna Rudd MD Attending Provider Obstetrics & Gynecology [phone] Your Primary Care Physician (PCP) Primary Care Physician Office Phone Office Fax Elizabeth Meckel [phone] Follow-up Information Follow up With Details Comments Contact Info Dayanna Maldonado MD   [address] [address] [phone] Current Discharge Medication List  
  
START taking these medications Dose & Instructions Dispensing Information Comments Morning Noon Evening Bedtime  
 ibuprofen 800 mg tablet Commonly known as:  MOTRIN Your last dose was: Your next dose is:    
   
   
 Dose:  800 mg Take 1 Tab by mouth every eight (8) hours as needed for Pain. Quantity:  60 Tab Refills:  0 Pt has rx  
    
   
   
   
  
 oxyCODONE-acetaminophen 5-325 mg per tablet Commonly known as:  PERCOCET  
   
 Your last dose was: Your next dose is:    
   
   
 Dose:  1 Tab Take 1 Tab by mouth every four (4) hours as needed for Pain. Max Daily Amount: 6 Tabs. Quantity:  20 Tab Refills:  0 Pt has rx CONTINUE these medications which have NOT CHANGED Dose & Instructions Dispensing Information Comments Morning Noon Evening Bedtime LEVOTHYROXINE PO Your last dose was: Your next dose is: Take  by mouth daily. Refills:  0 PROzac 20 mg capsule Generic drug:  FLUoxetine Your last dose was: Your next dose is:    
   
   
 Dose:  20 mg Take 20 mg by mouth daily. Refills:  0  
     
   
   
   
  
 traZODone 50 mg tablet Commonly known as:  Auther Sax Your last dose was: Your next dose is: Take  by mouth nightly. Refills:  0  
     
   
   
   
  
 ZANTAC 150 mg tablet Generic drug:  raNITIdine Your last dose was: Your next dose is:    
   
   
 Dose:  150 mg Take 150 mg by mouth daily. Refills:  0 Where to Get Your Medications Information on where to get these meds will be given to you by the nurse or doctor. ! Ask your nurse or doctor about these medications  
  ibuprofen 800 mg tablet  
 oxyCODONE-acetaminophen 5-325 mg per tablet Discharge Instructions GYN Discharge Instructions Physical Activity Frequent rest periods No sex, douches, tampons, deep tub baths, pools or hot tubs for 6 weeks. No heavy lifting, pulling or pushing for 6 weeks No driving for 2 weeks Call your doctor if: 
You have pain, swelling, or redness in your calf or legs You have excessive pain not controlled by pain meds. You have a temperature over 101 degrees You saturate a sanitary napkin in less than 4 hours You have any foul smelling drainage You have frequency, pain or burning when you urinate; problems urinating or having a BM. Your incision has , drains or bleeds You have any severe abdominal, back or chest pain. Diet As tolerated Follow up with your doctor in 2 weeks DISCHARGE SUMMARY from Nurse The following personal items collected during your admission are returned to you:  
Dental Appliance: Dental Appliances: None Vision: Visual Aid: None Hearing Aid:   
Jewelry: Jewelry: None Clothing: Clothing: Jacket/Coat, Pants, Shirt, Socks, Undergarments Other Valuables: Other Valuables: None Valuables sent to safe:   
 
 
 
 
 
 
*  Please give a list of your current medications to your Primary Care Provider. *  Please update this list whenever your medications are discontinued, doses are 
    changed, or new medications (including over-the-counter products) are added. *  Please carry medication information at all times in case of emergency situations. These are general instructions for a healthy lifestyle: No smoking/ No tobacco products/ Avoid exposure to second hand smoke Surgeon General's Warning:  Quitting smoking now greatly reduces serious risk to your health. Obesity, smoking, and sedentary lifestyle greatly increases your risk for illness A healthy diet, regular physical exercise & weight monitoring are important for maintaining a healthy lifestyle You may be retaining fluid if you have a history of heart failure or if you experience any of the following symptoms:  Weight gain of 3 pounds or more overnight or 5 pounds in a week, increased swelling in our hands or feet or shortness of breath while lying flat in bed. Please call your doctor as soon as you notice any of these symptoms; do not wait until your next office visit. Recognize signs and symptoms of STROKE: 
 
F-face looks uneven A-arms unable to move or move unevenly S-speech slurred or non-existent T-time-call 911 as soon as signs and symptoms begin-DO NOT go Back to bed or wait to see if you get better-TIME IS BRAIN. The discharge information has been reviewed with the patient. The patient verbalized understanding. Patient armband removed and shredded MyChart Activation Thank you for requesting access to [employer]. Please follow the instructions below to securely access and download your online medical record. WePopp allows you to send messages to your doctor, view your test results, renew your prescriptions, schedule appointments, and more. How Do I Sign Up? 1. In your internet browser, go to [email or web address]. 2. Click on the First Time User? Click Here link in the Sign In box. You will see the New Member Sign Up page. 3. Enter your WePopp Access Code exactly as it appears below. You will not need to use this code after youve completed the sign-up process. If you do not sign up before the expiration date, you must request a new code. WePopp Access Code: [email or web address] Expires: 2017  4:53 PM (This is the date your WePopp access code will ) 4. Enter the last four digits of your Social Security Number (xxxx) and Date of Birth (mm/dd/yyyy) as indicated and click Submit. You will be taken to the next sign-up page. 5. Create a WePopp ID. This will be your WePopp login ID and cannot be changed, so think of one that is secure and easy to remember. 6. Create a WePopp password. You can change your password at any time. 7. Enter your Password Reset Question and Answer. This can be used at a later time if you forget your password. 8. Enter your e-mail address. You will receive e-mail notification when new information is available in [address] 9. Click Sign Up. You can now view and download portions of your medical record. 10. Click the Download Summary menu link to download a portable copy of your medical information. Additional Information If you have questions, please visit the Frequently Asked Questions section of the [employer] website at [email or web address] Remember, Ringostat is NOT to be used for urgent needs. For medical emergencies, dial 911. Discharge Orders None [facility] [employer] Announcement We are excited to announce that we are making your provider's discharge notes available to you in Ringostat. You will see these notes when they are completed and signed by the physician that discharged you from your recent hospital stay. If you have any questions or concerns about any information you see in Ringostat, please call the Health Information Department where you were seen or reach out to your Primary Care Provider for more information about your plan of care. Introducing [facility] SERVICES! Cayetano Sierra introduces Ringostat patient portal. Now you can access parts of your medical record, email your doctor's office, and request medication refills online. 1. In your internet browser, go to [email or web address] 2. Click on the First Time User? Click Here link in the Sign In box. You will see the New Member Sign Up page. 3. Enter your Ringostat Access Code exactly as it appears below. You will not need to use this code after youve completed the sign-up process. If you do not sign up before the expiration date, you must request a new code. · Ring[employer] Access Code: [email or web address] Expires: 6/27/2017  4:53 PM 
 
4. Enter the last four digits of your Social Security Number (xxxx) and Date of Birth (mm/dd/yyyy) as indicated and click Submit. You will be taken to the next sign-up page. 5. Create a Ringostat ID. This will be your Ringostat login ID and cannot be changed, so think of one that is secure and easy to remember. 6. Create a Ringostat password. You can change your password at any time. 7. Enter your Password Reset Question and Answer.  This can be used at a later time if you forget your password. 8. Enter your e-mail address. You will receive e-mail notification when new information is available in [address] 9. Click Sign Up. You can now view and download portions of your medical record. 10. Click the Download Summary menu link to download a portable copy of your medical information. If you have questions, please visit the Frequently Asked Questions section of the [employer] (ProCiv) website. Remember, [employer]) is NOT to be used for urgent needs. For medical emergencies, dial 911. Now available from your iPhone and Android! General Information Please provide this summary of care documentation to your next provider. Patient Signature:  ____________________________________________________________ Date:  ____________________________________________________________  
  
[facility] Provider Signature:  ____________________________________________________________ Date:  ____________________________________________________________

## 2017-04-04 NOTE — ANESTHESIA PREPROCEDURE EVALUATION
Anesthetic History   No history of anesthetic complications            Review of Systems / Medical History  Patient summary reviewed and pertinent labs reviewed    Pulmonary            Asthma (Last attack-2 years ago) : well controlled       Neuro/Psych         Psychiatric history (H/O depression)     Cardiovascular                  Exercise tolerance: >4 METS  Comments: H/O fibromyalgia   GI/Hepatic/Renal     GERD: well controlled           Endo/Other      Hypothyroidism       Other Findings   Comments:   Risk Factors for Postoperative nausea/vomiting:       History of postoperative nausea/vomiting? NO       Female? YES       Motion sickness? NO       Intended opioid administration for postoperative analgesia?   YES           Physical Exam    Airway  Mallampati: II  TM Distance: 4 - 6 cm  Neck ROM: normal range of motion   Mouth opening: Normal     Cardiovascular  Regular rate and rhythm,  S1 and S2 normal,  no murmur, click, rub, or gallop             Dental  No notable dental hx       Pulmonary  Breath sounds clear to auscultation               Abdominal  GI exam deferred       Other Findings            Anesthetic Plan    ASA: 2  Anesthesia type: general          Induction: Intravenous  Anesthetic plan and risks discussed with: Patient

## 2017-04-04 NOTE — DISCHARGE SUMMARY
Gynecology Surgical Discharge Summary     Name: Asher Albright MRN: [id number]  SSN: [id number]    YOB: 1965  Age: 46 y.o. Sex: female      Admit date: 4/4/2017    Discharge Date: 4/4/2017      Attending Physician: Felipe Conn MD     Admission Diagnoses: Pelvic pain    Discharge Diagnoses: Active Problems:    * No active hospital problems. *       Procedures: TLH, BS, lysis of adhesions    Hospital Course: Normal hospital course for this procedure. Significant Diagnostic Studies:   Recent Results (from the past 24 hour(s))   HCG URINE, QL. - POC    Collection Time: 04/04/17  6:57 AM   Result Value Ref Range    Pregnancy test,urine (POC) NEGATIVE  NEG         Patient Instructions:   Current Discharge Medication List      START taking these medications    Details   oxyCODONE-acetaminophen (PERCOCET) 5-325 mg per tablet Take 1 Tab by mouth every four (4) hours as needed for Pain. Max Daily Amount: 6 Tabs. Qty: 20 Tab, Refills: 0    Comments: Pt has rx      ibuprofen (MOTRIN) 800 mg tablet Take 1 Tab by mouth every eight (8) hours as needed for Pain. Qty: 60 Tab, Refills: 0    Comments: Pt has rx         CONTINUE these medications which have NOT CHANGED    Details   LEVOTHYROXINE SODIUM (LEVOTHYROXINE PO) Take  by mouth daily. FLUoxetine (PROZAC) 20 mg capsule Take 20 mg by mouth daily. traZODone (DESYREL) 50 mg tablet Take  by mouth nightly. ranitidine (ZANTAC) 150 mg tablet Take 150 mg by mouth daily. Activity: No sex, douching, or tampons for 6 weeks or as directed by your physician. No heavy lifting for 6 weeks. No driving while taking pain medication. Diet: Resume pre-hospital diet  Wound Care: Keep wound clean and dry    Follow-up Appointments   Procedures    FOLLOW UP VISIT Appointment in: Two Weeks     Standing Status:   Standing     Number of Occurrences:   1     Order Specific Question:   Appointment in     Answer:    Two Weeks        Signed By: Marcial Murrieta MD     April 4, 2017

## 2018-06-29 ENCOUNTER — OFFICE VISIT (OUTPATIENT)
Dept: ORTHOPEDIC SURGERY | Facility: CLINIC | Age: 53
End: 2018-06-29

## 2018-06-29 VITALS
WEIGHT: 176.4 LBS | DIASTOLIC BLOOD PRESSURE: 80 MMHG | SYSTOLIC BLOOD PRESSURE: 132 MMHG | TEMPERATURE: 96.6 F | BODY MASS INDEX: 31.25 KG/M2 | HEART RATE: 85 BPM | RESPIRATION RATE: 16 BRPM | HEIGHT: 63 IN | OXYGEN SATURATION: 96 %

## 2018-06-29 DIAGNOSIS — G89.29 CHRONIC PAIN OF LEFT KNEE: ICD-10-CM

## 2018-06-29 DIAGNOSIS — M25.562 CHRONIC PAIN OF LEFT KNEE: ICD-10-CM

## 2018-06-29 DIAGNOSIS — M17.12 PRIMARY OSTEOARTHRITIS OF LEFT KNEE: Primary | ICD-10-CM

## 2018-06-29 RX ORDER — BETAMETHASONE SODIUM PHOSPHATE AND BETAMETHASONE ACETATE 3; 3 MG/ML; MG/ML
6 INJECTION, SUSPENSION INTRA-ARTICULAR; INTRALESIONAL; INTRAMUSCULAR; SOFT TISSUE ONCE
Qty: 0.5 ML | Refills: 0
Start: 2018-06-29 | End: 2018-06-29

## 2018-06-29 RX ORDER — BUPIVACAINE HYDROCHLORIDE 2.5 MG/ML
4 INJECTION, SOLUTION EPIDURAL; INFILTRATION; INTRACAUDAL ONCE
Qty: 4 ML | Refills: 0
Start: 2018-06-29 | End: 2018-06-29

## 2018-06-29 NOTE — PROGRESS NOTES
Patient: Gerda Multani                MRN: [id number]       SSN: [id number]  YOB: 1965        AGE: 52 y.o. SEX: female    PCP: Susi Hodge MD  06/29/18    Chief Complaint   Patient presents with    Knee Pain     L KNEE PAIN     HISTORY:  Gerda Multani is a 52 y.o. female who sustained a left knee injury in 12/2017. She twisted her knee when she was wearing heeled boots while at work at [employer] in [place], [place]. She did not report this injury as a work injury. She noticed an immediate pop and an instant pain. She ignored her pain and limped back to class. She reports that her leg was swollen for 2-3 days 7 days after the injury. She reports continued pain when she bends her knees in certain ways. She reports previous intermittent pain. She has been experiencing pain and discomfort in her left knee since. She reports pain when bending and sitting Wesley style. She also experiences some right lower back pain and is seeing the spine     Pain Assessment  6/29/2018   Location of Pain Knee   Location Modifiers Left   Severity of Pain 8   Quality of Pain Sharp;Popping;Locking   Duration of Pain Persistent   Frequency of Pain Constant   Aggravating Factors Bending; Other (Comment); Exercise   Aggravating Factors Comment CANNOT SIT CROSS LEGGED ANYMORE & BENDING A CERTAIN DIRECTION    Limiting Behavior Yes   Relieving Factors NSAID   Result of Injury No     Occupation, etc:  Ms. Wily Rosas is an 8th grade  at [employer] She lives in [place] with her 22yo son and 2 dogs. Current weight is 176 pounds. She is 5'3\" tall.       No results found for: HBA1C, HGBE8, UIB3FONW, LCT3MRFX  Weight Metrics 6/29/2018 4/4/2017 7/17/2015 8/28/2013 6/21/2013 6/17/2013 2/20/2013   Weight 176 lb 6.4 oz 175 lb 180 lb 167 lb - 160 lb 165 lb   BMI 31.25 kg/m2 31 kg/m2 31.89 kg/m2 29.59 kg/m2 28.35 kg/m2 - 29.24 kg/m2       Patient Active Problem List Diagnosis Code    Rectal pain K62.89    Hemorrhoids K64.9    Rectocele N81.6    Bright red rectal bleeding K62.5     REVIEW OF SYSTEMS: All Below are Negative except: See HPI   Constitutional: negative for fever, chills, and weight loss. Cardiovascular: negative for chest pain, claudication, leg swelling, SOB, YEUNG   Gastrointestinal: Negative for pain, N/V/C/D, Blood in stool or urine, dysuria,  hematuria, incontinence, pelvic pain. Musculoskeletal: See HPI   Neurological: Negative for dizziness and weakness. Negative for headaches, Visual changes, confusion, seizures   Phychiatric/Behavioral: Negative for depression, memory loss, substance  abuse. Extremities: Negative for hair changes, rash, or skin lesion changes. Hematologic: Negative for bleeding problems, bruising, pallor or swollen lymph  nodes   Peripheral Vascular: No calf pain, no circulation deficits. Social History     Social History    Marital status: SINGLE     Spouse name: N/A    Number of children: N/A    Years of education: N/A     Occupational History    Not on file. Social History Main Topics    Smoking status: Never Smoker    Smokeless tobacco: Never Used    Alcohol use Yes      Comment: occassionally    Drug use: No    Sexual activity: Not on file     Other Topics Concern    Not on file     Social History Narrative      Allergies   Allergen Reactions    Codeine Hives and Nausea Only    Sulfa (Sulfonamide Antibiotics) Hives      Current Outpatient Prescriptions   Medication Sig    Cetirizine (ZYRTEC) 10 mg cap Take  by mouth.  ibuprofen (MOTRIN) 800 mg tablet Take 1 Tab by mouth every eight (8) hours as needed for Pain.  FLUoxetine (PROZAC) 20 mg capsule Take 20 mg by mouth daily.  traZODone (DESYREL) 50 mg tablet Take  by mouth nightly.  ranitidine (ZANTAC) 150 mg tablet Take 150 mg by mouth daily.     oxyCODONE-acetaminophen (PERCOCET) 5-325 mg per tablet Take 1 Tab by mouth every four (4) hours as needed for Pain. Max Daily Amount: 6 Tabs.  LEVOTHYROXINE SODIUM (LEVOTHYROXINE PO) Take  by mouth daily. No current facility-administered medications for this visit. PHYSICAL EXAMINATION:  Visit Vitals    /80    Pulse 85    Temp 96.6 °F (35.9 °C) (Oral)    Resp 16    Ht 5' 3\" (1.6 m)    Wt 176 lb 6.4 oz (80 kg)    SpO2 96%    BMI 31.25 kg/m2      ORTHO EXAMINATION:  Examination Right knee Left knee   Skin Intact Intact   Range of motion 120-0 120-0   Effusion - -   Medial joint line tenderness + +   Lateral joint line tenderness - -   Popliteal tenderness - -   Osteophytes palpable - +   Nelsons - -   Patella crepitus - +   Anterior drawer - -   Lateral laxity - -   Medial laxity - -   Varus deformity - -   Valgus deformity - -   Pretibial edema +1 +1   Calf tenderness - -     PROCEDURE:  After discussing treatment options, patient's left knee was injected with 4 cc Marcaine and 1/2 cc Celestone. Chart reviewed for the following:   Dustin Bonner MD, have reviewed the History, Physical and updated the Allergic reactions for [address] performed immediately prior to start of procedure:  Dustin Bonner MD, have performed the following reviews on Kali Wei prior to the start of the procedure:            * Patient was identified by name and date of birth   * Agreement on procedure being performed was verified  * Risks and Benefits explained to the patient  * Procedure site verified and marked as necessary  * Patient was positioned for comfort  * Consent was obtained     Time: 4:00 PM     Date of procedure: 6/29/2018    Procedure performed by:  Mark Bain MD    Ms. Amelia Wong tolerated the procedure well with no complications. RADIOGRAPHS:  XR LT KNEE 6/29/2018 KIZZY  IMPRESSION:  Three views - No fractures, no effusion, mild joint space narrowing, small osteophytes present.  IKDC Grade B. Mild squaring of condyles    IMPRESSION:      ICD-10-CM ICD-9-CM    1. Chronic pain of left knee M25.562 719.46 AMB POC X-RAY KNEE 3 VIEW    G89.29 338.29      PLAN:  After discussing treatment options, patient's left knee was injected with 4 cc Marcaine and 1/2 cc Celestone. An left knee MRI was ordered. She will follow up with the results of her left knee MRI. She will be scheduled at the spine center for her right lower back pain.     Scribed by Greyson Covington [address] [facility] [address]) as dictated by Arlen Mosley MD

## 2018-07-05 ENCOUNTER — HOSPITAL ENCOUNTER (OUTPATIENT)
Dept: MAMMOGRAPHY | Age: 53
Discharge: HOME | End: 2018-07-05
Payer: COMMERCIAL

## 2018-07-05 DIAGNOSIS — Z12.39 SCREENING BREAST EXAMINATION: ICD-10-CM

## 2018-07-05 PROCEDURE — 77067 SCR MAMMO BI INCL CAD: CPT

## 2018-07-11 ENCOUNTER — HOSPITAL ENCOUNTER (OUTPATIENT)
Age: 53
Discharge: HOME | End: 2018-07-11
Attending: SPECIALIST
Payer: COMMERCIAL

## 2018-07-11 DIAGNOSIS — G89.29 CHRONIC PAIN OF LEFT KNEE: ICD-10-CM

## 2018-07-11 DIAGNOSIS — M25.562 CHRONIC PAIN OF LEFT KNEE: ICD-10-CM

## 2018-07-11 DIAGNOSIS — M17.12 PRIMARY OSTEOARTHRITIS OF LEFT KNEE: ICD-10-CM

## 2018-07-11 PROCEDURE — 73721 MRI JNT OF LWR EXTRE W/O DYE: CPT

## 2018-07-23 ENCOUNTER — OFFICE VISIT (OUTPATIENT)
Dept: ORTHOPEDIC SURGERY | Age: 53
End: 2018-07-23

## 2018-07-23 VITALS
WEIGHT: 180.4 LBS | OXYGEN SATURATION: 96 % | BODY MASS INDEX: 31.96 KG/M2 | RESPIRATION RATE: 17 BRPM | DIASTOLIC BLOOD PRESSURE: 78 MMHG | HEART RATE: 84 BPM | HEIGHT: 63 IN | SYSTOLIC BLOOD PRESSURE: 122 MMHG

## 2018-07-23 DIAGNOSIS — M54.2 CERVICAL PAIN: ICD-10-CM

## 2018-07-23 DIAGNOSIS — M54.50 LOW BACK PAIN WITHOUT SCIATICA, UNSPECIFIED BACK PAIN LATERALITY, UNSPECIFIED CHRONICITY: Primary | ICD-10-CM

## 2018-07-23 DIAGNOSIS — M51.36 DDD (DEGENERATIVE DISC DISEASE), LUMBAR: ICD-10-CM

## 2018-07-23 DIAGNOSIS — M54.16 LUMBAR NEURITIS: ICD-10-CM

## 2018-07-23 DIAGNOSIS — M47.817 LUMBOSACRAL SPONDYLOSIS WITHOUT MYELOPATHY: ICD-10-CM

## 2018-07-23 NOTE — PROGRESS NOTES
[facility]  [address], [address]  Phone: [phone]  Fax: [phone]        INITIAL CONSULTATION      HISTORY OF PRESENT ILLNESS:  Chandrika Fleming is a 52 y.o. female whom is referred from Mindy Villagran MD secondary to  low back pain following lifting a heavy TV many years ago. She reports some intermittent pains into her BLE alternating. She additionally endorses neck pain x 3 months. She rates her pain 3/10. She recalls having a shoulder issue a few years ago. Pain is exacerbated by sitting. She denies spinal surgery, PT, or injections. She recalls having chiropractic treatment with no relief. She is self treating with Motrin with some relief. Pt denies fever, weight loss, or skin changes. Pt denies change in bowel or bladder habits. Note from Mindy Villagran MD dated 18 reviewed. No mention of spinal pain complaints in the office note. The patient is RHD.  reviewed. Body mass index is 31.96 kg/(m^2). PCP: Mindy Villagran MD    Past Medical History:   Diagnosis Date    Asthma     Cancer ([facility] [address]     skin ca removed    Fibromyalgia     GERD (gastroesophageal reflux disease)     Hypothyroid     Menopause     Mitral valve prolapse     Psychiatric disorder     depression          Past Surgical History:   Procedure Laterality Date    HX APPENDECTOMY      HX GYN       x2    HX OTHER SURGICAL      lysis adhesions    HX OTHER SURGICAL  2009    ablation of uterus         Social History   Substance Use Topics    Smoking status: Never Smoker    Smokeless tobacco: Never Used    Alcohol use Yes      Comment: occassionally     Work status: The patient is employed. Marital status: The patient is single. Current Outpatient Prescriptions   Medication Sig Dispense Refill    Cetirizine (ZYRTEC) 10 mg cap Take  by mouth.       ibuprofen (MOTRIN) 800 mg tablet Take 1 Tab by mouth every eight (8) hours as needed for Pain. 60 Tab 0    FLUoxetine (PROZAC) 20 mg capsule Take 20 mg by mouth daily.  traZODone (DESYREL) 50 mg tablet Take  by mouth nightly.  ranitidine (ZANTAC) 150 mg tablet Take 150 mg by mouth daily.  oxyCODONE-acetaminophen (PERCOCET) 5-325 mg per tablet Take 1 Tab by mouth every four (4) hours as needed for Pain. Max Daily Amount: 6 Tabs. 20 Tab 0    LEVOTHYROXINE SODIUM (LEVOTHYROXINE PO) Take  by mouth daily. Allergies   Allergen Reactions    Codeine Hives and Nausea Only    Sulfa (Sulfonamide Antibiotics) Hives            Family History   Problem Relation Age of Onset    Cancer Sister     Cancer Maternal Aunt     Cancer Maternal Grandmother     Cancer Paternal Grandmother          REVIEW OF SYSTEMS  Constitutional symptoms: Negative  Eyes: Negative  Ears, Nose, Throat, and Mouth: Negative  Cardiovascular: Negative  Respiratory: Negative  Genitourinary: Negative  Integumentary (Skin and/or breast): Negative  Musculoskeletal: Positive for low back pain, neck pain  Extremities: Negative for edema. Endocrine/Rheumatologic: Negative  Hematologic/Lymphatic: Negative  Allergic/Immunologic: Negative  Psychiatric: Negative       PHYSICAL EXAMINATION    Visit Vitals    /78    Pulse 84    Resp 17    Ht 5' 3\" (1.6 m)    Wt 81.8 kg (180 lb 6.4 oz)    SpO2 96%    BMI 31.96 kg/m2       CONSTITUTIONAL: NAD, A&O x 3  HEART: Regular rate and rhythm  ABDOMEN: Positive bowel sounds, soft, nontender, and nondistended  LUNGS: Clear to auscultation bilaterally. SKIN: Negative for rash. RANGE OF MOTION: The patient has full passive range of motion in all four extremities. SENSATION: Sensation is intact to light touch throughout. MOTOR:   Straight Leg Raise: Negative, bilateral  Mckinney: Negative, bilateral  Tandem Gait: Neg. Deep tendon reflexes are 2+ at the brachioradialis, biceps, and triceps bilaterally.   Deep tendon reflexes are 2+ at the knees and 1+ at the ankles bilaterally. Shoulder AB/Flex Elbow Flex Wrist Ext Elbow Ext Wrist Flex Hand Intrin Tone   Right +4/5 +4/5 +4/5 +4/5 +4/5 +4/5 +4/5   Left +4/5 +4/5 +4/5 +4/5 +4/5 +4/5 +4/5              Hip Flex Knee Ext Knee Flex Ankle DF GTE Ankle PF Tone   Right +4/5 +4/5 +4/5 +4/5 +4/5 +4/5 +4/5   Left +4/5 +4/5 +4/5 +4/5 +4/5 +4/5 +4/5     RADIOGRAPHS  Preliminary reading of lumbar plain films:  Mild disc space narrowing at L4-5 and L3-4. Small anterior osteophytes noted at L2-L5. Age appropriate degenerative changes. No acute pathology identified. These are being sent out for official reading by Dr. Briseida Rojo. ASSESSMENT   Diagnoses and all orders for this visit:    1. Low back pain without sciatica, unspecified back pain laterality, unspecified chronicity  -     [96250] L/S 2-3 views    2. DDD (degenerative disc disease), lumbar    3. Lumbar neuritis    4. Lumbosacral spondylosis without myelopathy    5. Cervical pain           IMPRESSIONS/RECOMMENDATIONS:  Patient presents with low back pain with occasional intermittent alternating pain into the BLE. She additionally endorses neck pain without radicular symptoms. The patient does not think her remaining pain complaints are severe enough to warrant additional workup/treatment at this time. Patient is neurologically intact. Multiple treatment options were discussed. I offered to try her on MDP but she declined. PT declined at this time. I will see the patient back prn. Written by Marce Kumar, as dictated by Patti Langford MD  I examined the patient, reviewed and agree with the note.

## 2018-07-23 NOTE — MR AVS SNAPSHOT
[address] 
[phone] Patient: Gayla Jones MRN: [id number] :1965 Visit Information Date & Time Provider Department Dept. Phone Encounter #  
 2018 10:35 AM Bebo Adan  [facility], [address] [facility] - [place] [address] [phone] [id number] Follow-up Instructions Return if symptoms worsen or fail to improve. Your Appointments 2018  3:40 PM  
DIAG TEST F/U with Dasia Rea MD  
[address] [facility], [address] and [facility] - [facility] [address] [facility]) Appt Note: LT KNEE MRI BRING DISC  
 [address] [facility], [address] [facility] [id number] [phone]  
  
   
 [address] [facility], [address] [place] [id number] Upcoming Health Maintenance Date Due Hepatitis C Screening 1965 DTaP/Tdap/Td series (1 - Tdap) 1984 PAP AKA CERVICAL CYTOLOGY 1984 FOBT Q 1 YEAR AGE 50-75 2013 Influenza Age 5 to Adult 2018 BREAST CANCER SCRN MAMMOGRAM 2020 Allergies as of 2018  Review Complete On: 2018 By: Bebo Adan MD  
  
 Severity Noted Reaction Type Reactions Codeine  2013    Hives, Nausea Only Sulfa (Sulfonamide Antibiotics)  2013    Hives Current Immunizations  Reviewed on 2017 No immunizations on file. Not reviewed this visit You Were Diagnosed With   
  
 Codes Comments Low back pain without sciatica, unspecified back pain laterality, unspecified chronicity    -  Primary ICD-10-CM: M54.5 ICD-9-CM: 724.2 DDD (degenerative disc disease), lumbar     ICD-10-CM: M51.36 
ICD-9-CM: 722.52 Lumbar neuritis     ICD-10-CM: M54.16 
ICD-9-CM: 724.4 Lumbosacral spondylosis without myelopathy     ICD-10-CM: O73.338 ICD-9-CM: 721.3 Cervical pain     ICD-10-CM: M54.2 ICD-9-CM: 723.1 Vitals BP Pulse Resp Height(growth percentile) Weight(growth percentile) SpO2  
 122/78 84 17 5' 3\" (1.6 m) 180 lb 6.4 oz (81.8 kg) 96% BMI OB Status Smoking Status 31.96 kg/m2 Ablation Never Smoker BMI and BSA Data Body Mass Index Body Surface Area 31.96 kg/m 2 1.91 m 2 Preferred Pharmacy Pharmacy Name Phone [facility], [address]  [phone] Your Updated Medication List  
  
   
This list is accurate as of 7/23/18 11:41 AM.  Always use your most recent med list.  
  
  
  
  
 ibuprofen 800 mg tablet Commonly known as:  MOTRIN Take 1 Tab by mouth every eight (8) hours as needed for Pain. LEVOTHYROXINE PO Take  by mouth daily. oxyCODONE-acetaminophen 5-325 mg per tablet Commonly known as:  PERCOCET Take 1 Tab by mouth every four (4) hours as needed for Pain. Max Daily Amount: 6 Tabs. PROzac 20 mg capsule Generic drug:  FLUoxetine Take 20 mg by mouth daily. traZODone 50 mg tablet Commonly known as:  Harinder Keith Take  by mouth nightly. ZANTAC 150 mg tablet Generic drug:  raNITIdine Take 150 mg by mouth daily. ZyrTEC 10 mg Cap Generic drug:  Cetirizine Take  by mouth. We Performed the Following AMB POC XRAY, SPINE, LUMBOSACRAL; 2 O [42601 CPT(R)] Follow-up Instructions Return if symptoms worsen or fail to improve. Introducing [facility]! Dear Hayden Pelletier: Thank you for requesting a [employer] account. Our records indicate that you already have an active [employer] account. You can access your account anytime at [email or web address] ([facility]). [employer]/[facility]) Did you know that you can access your hospital and ER discharge instructions at any time in e[employer] in Motion? You can also review all of your test results from your hospital stay or ER visit. Additional Information If you have questions, please visit the Frequently Asked Questions section of the [employer] website at [email or web address] [email or web address] Remember, [employer] is NOT to be used for urgent needs. For medical emergencies, dial 911. Now available from your iPhone and Android! Please provide this summary of care documentation to your next provider. Your primary care clinician is listed as Mindy Villagran. If you have any questions after today's visit, please call [phone].

## 2018-07-27 ENCOUNTER — OFFICE VISIT (OUTPATIENT)
Dept: ORTHOPEDIC SURGERY | Facility: CLINIC | Age: 53
End: 2018-07-27

## 2018-07-27 VITALS
HEART RATE: 89 BPM | TEMPERATURE: 97.5 F | SYSTOLIC BLOOD PRESSURE: 125 MMHG | DIASTOLIC BLOOD PRESSURE: 102 MMHG | WEIGHT: 178.8 LBS | BODY MASS INDEX: 31.68 KG/M2 | RESPIRATION RATE: 16 BRPM | HEIGHT: 63 IN | OXYGEN SATURATION: 96 %

## 2018-07-27 DIAGNOSIS — G89.29 CHRONIC PAIN OF LEFT KNEE: ICD-10-CM

## 2018-07-27 DIAGNOSIS — S92.515A CLOSED NONDISPLACED FRACTURE OF PROXIMAL PHALANX OF LESSER TOE OF LEFT FOOT, INITIAL ENCOUNTER: ICD-10-CM

## 2018-07-27 DIAGNOSIS — M25.562 CHRONIC PAIN OF LEFT KNEE: ICD-10-CM

## 2018-07-27 DIAGNOSIS — M70.52 PES ANSERINUS BURSITIS OF LEFT KNEE: ICD-10-CM

## 2018-07-27 DIAGNOSIS — M79.671 RIGHT FOOT PAIN: Primary | ICD-10-CM

## 2018-07-27 RX ORDER — IBUPROFEN 400 MG/1
TABLET ORAL
COMMUNITY

## 2018-07-27 RX ORDER — BETAMETHASONE SODIUM PHOSPHATE AND BETAMETHASONE ACETATE 3; 3 MG/ML; MG/ML
6 INJECTION, SUSPENSION INTRA-ARTICULAR; INTRALESIONAL; INTRAMUSCULAR; SOFT TISSUE ONCE
Qty: 0.5 ML | Refills: 0
Start: 2018-07-27 | End: 2018-07-27

## 2018-07-27 RX ORDER — BUPIVACAINE HYDROCHLORIDE 2.5 MG/ML
4 INJECTION, SOLUTION EPIDURAL; INFILTRATION; INTRACAUDAL ONCE
Qty: 4 ML | Refills: 0
Start: 2018-07-27 | End: 2018-07-27

## 2018-07-27 NOTE — PATIENT INSTRUCTIONS
Knee (Pes Anserine) Bursitis: Exercises Your Care Instructions Here are some examples of typical rehabilitation exercises for your condition. Start each exercise slowly. Ease off the exercise if you start to have pain. Your doctor or physical therapist will tell you when you can start these exercises and which ones will work best for you. How to do the exercises Heel slide 1. Lie on your back with your affected knee straight. Your good knee should be bent. 2. Bend your affected knee by sliding your heel across the floor and toward your buttock until you feel a gentle stretch in your knee. 3. Hold for about 6 seconds, and then slowly straighten your knee. 4. Repeat 8 to 12 times. [employer] 1. Sit with your affected leg straight and supported on the floor or a firm bed. Place a small, rolled-up towel under your affected knee. Your other leg should be bent, with that foot flat on the floor. 2. Tighten the thigh muscles of your affected leg by pressing the back of your knee down into the towel. 3. Hold for about 6 seconds, then rest for up to 10 seconds. 4. Repeat 8 to 12 times. Straight-leg raises to the front 1. Lie on your back with your good knee bent so that your foot rests flat on the floor. Your affected leg should be straight. Make sure that your low back has a normal curve. You should be able to slip your hand in between the floor and the small of your back, with your palm touching the floor and your back touching the back of your hand. 2. Tighten the thigh muscles in your affected leg by pressing the back of your knee flat down to the floor. Hold your knee straight. 3. Keeping the thigh muscles tight and your leg straight, lift your affected leg up so that your heel is about 12 inches off the floor. 4. Hold for about 6 seconds, then lower slowly. Rest for up to 10 seconds between repetitions. 5. Repeat 8 to 12 times. Follow-up care is a key part of your treatment and safety.  Be sure to make and go to all appointments, and call your doctor if you are having problems. It's also a good idea to know your test results and keep a list of the medicines you take. Where can you learn more? Go to http://aden-javed.info/. Enter F106 in the search box to learn more about \"Knee (Pes Anserine) Bursitis: Exercises. \" Current as of: November 29, 2017 Content Version: 11.7 © 8021-1650 [employer]. Care instructions adapted under license by [employer] (which disclaims liability or warranty for this information). If you have questions about a medical condition or this instruction, always ask your healthcare professional. [address] any warranty or liability for your use of this information.

## 2018-07-27 NOTE — PROGRESS NOTES
Patient: Ariella Deng                MRN: [id number]       SSN: [id number] YOB: 1965        AGE: 52 y.o. SEX: female PCP: Aaron Piedra MD 
07/27/18 Chief Complaint Patient presents with  Knee Pain L KNEE PAIN, F/U APPT. HISTORY:  Ariella Deng is a 52 y.o. female who sustained an inversion stress right foot injury on July 6th. She states she inverted her ankle while washing her rotweiler lab mix dog. She states she has been experiencing toe pain and is unable to bend her toes. She sustained a left knee injury in 12/2017. She twisted her knee when she was wearing heeled boots while at work at [employer] in [place], [place]. She did not report this injury as a work injury. She noticed an immediate pop and an instant pain. She ignored her pain and limped back to class. She reports that her leg was swollen for 2-3 days 7 days after the injury. She reports continued pain when she bends her knees in certain ways. She reports previous intermittent pain. She has been experiencing pain and discomfort in her left knee since. She reports pain when bending and sitting Wesley style. She also experiences some right lower back pain and is seeing the spine Pain Assessment  7/27/2018 Location of Pain Knee Location Modifiers Left Severity of Pain 4 Quality of Pain Aching;Locking Duration of Pain A few minutes Frequency of Pain Intermittent Aggravating Factors Bending; Other (Comment) Aggravating Factors Comment SENSATIVE TO [facility] Limiting Behavior Yes Relieving Factors NSAID Result of Injury Yes Work-Related Injury Yes How long out of work? NO Type of Injury Other (Comment) Type of Injury Comment TWISTED KNEE AT WORK Occupation, etc:  Ms. Rhonda Yanez is an 8th grade  at [employer]. She lives in [place] [address] with her 24yo son and 2 dogs. Current weight is 176 pounds. She is 5'3\" tall.    
 
No results found for: HBA1C, HGBE8, ZMF5BKMW, IFG7FMQT, XDH7VKGW Weight Metrics 7/27/2018 7/23/2018 6/29/2018 4/4/2017 7/17/2015 8/28/2013 6/21/2013 Weight 178 lb 12.8 oz 180 lb 6.4 oz 176 lb 6.4 oz 175 lb 180 lb 167 lb -  
BMI 31.67 kg/m2 31.96 kg/m2 31.25 kg/m2 31 kg/m2 31.89 kg/m2 29.59 kg/m2 28.35 kg/m2 Patient Active Problem List  
Diagnosis Code  Rectal pain K62.89  
 Hemorrhoids K64.9  Rectocele N81.6  Bright red rectal bleeding K62.5  DDD (degenerative disc disease), lumbar M51.36  
 Lumbar neuritis M54.16  
 Lumbosacral spondylosis without myelopathy M47.817  Cervical pain M54.2 REVIEW OF SYSTEMS: All Below are Negative except: See HPI Constitutional: negative for fever, chills, and weight loss. Cardiovascular: negative for chest pain, claudication, leg swelling, SOB, YEUNG Gastrointestinal: Negative for pain, N/V/C/D, Blood in stool or urine, dysuria,  hematuria, incontinence, pelvic pain. Musculoskeletal: See HPI Neurological: Negative for dizziness and weakness. Negative for headaches, Visual changes, confusion, seizures Phychiatric/Behavioral: Negative for depression, memory loss, substance  abuse. Extremities: Negative for hair changes, rash, or skin lesion changes. Hematologic: Negative for bleeding problems, bruising, pallor or swollen lymph  nodes Peripheral Vascular: No calf pain, no circulation deficits. Social History Social History  Marital status: SINGLE Spouse name: N/A  
 Number of children: N/A  
 Years of education: N/A Occupational History  Not on file. Social History Main Topics  Smoking status: Never Smoker  Smokeless tobacco: Never Used  Alcohol use Yes Comment: occassionally  Drug use: No  
 Sexual activity: Not on file Other Topics Concern  Not on file Social History Narrative Allergies Allergen Reactions  Codeine Hives and Nausea Only  Sulfa (Sulfonamide Antibiotics) Hives Current Outpatient Prescriptions Medication Sig  ibuprofen (MOTRIN) 400 mg tablet Take  by mouth every six (6) hours as needed for Pain.  Cetirizine (ZYRTEC) 10 mg cap Take  by mouth.  FLUoxetine (PROZAC) 20 mg capsule Take 20 mg by mouth daily.  traZODone (DESYREL) 50 mg tablet Take  by mouth nightly.  ranitidine (ZANTAC) 150 mg tablet Take 150 mg by mouth daily.  oxyCODONE-acetaminophen (PERCOCET) 5-325 mg per tablet Take 1 Tab by mouth every four (4) hours as needed for Pain. Max Daily Amount: 6 Tabs.  LEVOTHYROXINE SODIUM (LEVOTHYROXINE PO) Take  by mouth daily. No current facility-administered medications for this visit. PHYSICAL EXAMINATION: 
Visit Vitals  BP (!) 125/102  Pulse 89  Temp 97.5 °F (36.4 °C) (Oral)  Resp 16  
 Ht 5' 3\" (1.6 m)  Wt 178 lb 12.8 oz (81.1 kg)  SpO2 96%  BMI 31.67 kg/m2 ORTHO EXAMINATION: 
Examination Right knee Left knee Skin Intact Intact Range of motion 120-0 120-0 Effusion - - Medial joint line tenderness + + Lateral joint line tenderness - - Popliteal tenderness - -  
Osteophytes palpable - + Nelsons - - Patella crepitus - + Anterior drawer - - Lateral laxity - - Medial laxity - - Varus deformity - -  
Valgus deformity - - Pretibial edema +1 +1 Calf tenderness - - Examination Right Ankle/Foot Left Ankle/Foot Skin Intact Intact Swelling - - Dorsiflexion 10 10 Plantarflexion 25 25 Deformity - - Inversion laxity - - Anterior drawer - - Medial tenderness Of all toes - Lateral tenderness - - Heel cord Intact Intact Sensation Intact Intact Bunion - - Toe nails Normal Normal  
Capillary refill Normal Normal  
 
PROCEDURE:  After discussing treatment options, patient's left knee was injected with 4 cc Marcaine and 1/2 cc Celestone.  
 
Chart reviewed for the following: 
 Carolyn Ramírez MD, have reviewed the History, Physical and updated the Allergic reactions for Ebony Hameed TIME OUT performed immediately prior to start of procedure: 
Newton Hsieh MD, have performed the following reviews on Ebony Hameed prior to the start of the procedure: 
         
* Patient was identified by name and date of birth * Agreement on procedure being performed was verified * Risks and Benefits explained to the patient * Procedure site verified and marked as necessary * Patient was positioned for comfort * Consent was obtained Time: 4:00 PM  
 
Date of procedure: 7/27/2018 Procedure performed by:  Mich Ferreira MD 
 
Ms. Amelia Wong tolerated the procedure well with no complications. MRI LT KNEE Impression: No acute tears RADIOGRAPHS: 
XR RT FOOT 7/27/18 KIZZY IMPRESSION:  Three views - Proximal phalynx fracture of right 3rd toe, no bunion deformity, no heel spur. XR LT KNEE 6/29/2018 KIZZY IMPRESSION:  Three views - No fractures, no effusion, mild joint space narrowing, small osteophytes present. IKDC Grade B. Mild squaring of condyles IMPRESSION:   
  ICD-10-CM ICD-9-CM 1. Right foot pain M79.671 729.5 AMB POC XRAY, FOOT; COMPLETE, 3+ VIEW  
   CAST BOOT OR SHOE 2. Pes anserinus bursitis of left knee M70.52 726.61 betamethasone (CELESTONE SOLUSPAN) 6 mg/mL injection BETAMETHASONE ACETATE & SODIUM PHOSPHATE INJECTION 3 MG EA.  
   DRAIN/INJECT LARGE JOINT/BURSA  
   bupivacaine, PF, (MARCAINE, PF,) 0.25 % (2.5 mg/mL) injection 3. Chronic pain of left knee M25.562 719.46 betamethasone (CELESTONE SOLUSPAN) 6 mg/mL injection G89.29 338.29 BETAMETHASONE ACETATE & SODIUM PHOSPHATE INJECTION 3 MG EA.  
   DRAIN/INJECT LARGE JOINT/BURSA  
   bupivacaine, PF, (MARCAINE, PF,) 0.25 % (2.5 mg/mL) injection 4.  Closed nondisplaced fracture of proximal phalanx of lesser toe of left foot, initial encounter S92.515A 826.0 CAST BOOT OR SHOE  
 
PLAN:  After discussing treatment options, patient's left knee was injected with 4 cc Marcaine and 1/2 cc Celestone. There is no need for surgery at this time. Low profile boot applied. 3rd and 2nd toes lincoln taped together. She will follow up in 3 weeks with re-xray.   
 
Scribed by Johanna Mack [address]) as dictated by Fuad Valdivia MD

## 2018-08-22 ENCOUNTER — OFFICE VISIT (OUTPATIENT)
Dept: ORTHOPEDIC SURGERY | Age: 53
End: 2018-08-22

## 2018-08-22 VITALS
HEART RATE: 84 BPM | SYSTOLIC BLOOD PRESSURE: 119 MMHG | OXYGEN SATURATION: 95 % | BODY MASS INDEX: 31.89 KG/M2 | DIASTOLIC BLOOD PRESSURE: 57 MMHG | WEIGHT: 180 LBS | TEMPERATURE: 96.9 F | RESPIRATION RATE: 16 BRPM | HEIGHT: 63 IN

## 2018-08-22 DIAGNOSIS — M70.52 PES ANSERINUS BURSITIS OF LEFT KNEE: Primary | ICD-10-CM

## 2018-08-22 DIAGNOSIS — M17.12 PRIMARY OSTEOARTHRITIS OF LEFT KNEE: ICD-10-CM

## 2018-08-22 DIAGNOSIS — M79.671 RIGHT FOOT PAIN: ICD-10-CM

## 2018-08-22 DIAGNOSIS — G89.29 CHRONIC PAIN OF LEFT KNEE: ICD-10-CM

## 2018-08-22 DIAGNOSIS — M25.562 CHRONIC PAIN OF LEFT KNEE: ICD-10-CM

## 2018-08-22 RX ORDER — DICLOFENAC SODIUM 10 MG/G
4 GEL TOPICAL 4 TIMES DAILY
Qty: 5 EACH | Refills: 5 | Status: SHIPPED | OUTPATIENT
Start: 2018-08-22

## 2018-08-22 NOTE — PROGRESS NOTES
Patient: Varsha Carpenter                MRN: [id number]       SSN: [id number]  YOB: 1965        AGE: 52 y.o. SEX: female    PCP: Karen Salamanca MD  08/22/18    Chief Complaint   Patient presents with    Knee Pain     left knee f/u    Toe Pain     right middle toe f/u      HISTORY:  Varsha Carpenter is a 52 y.o. female who is seen for recurrent left knee pain. She has been experiencing pain for the past few months. Her knee injection last ov helped only for about one week. She sustained a left knee injury in 12/2017. She twisted her knee when she was wearing heeled boots while at work at [employer] in [address]. She did not report this injury as a work injury. She noticed an immediate pop and an instant pain. She ignored her pain and limped back to class. She reports that her leg was swollen for 2-3 days 7 days after the injury. She reports continued pain when she bends her knees in certain ways. She reports previous intermittent pain. She has been experiencing pain and discomfort in her left knee since. She reports pain when bending and sitting Wesley style. She previously sustained an inversion stress right foot injury on July 6th. She states she inverted her ankle while washing her rotweiler lab mix dog. She states she has been experiencing toe pain and is unable to bend her toes. She also experiences some right lower back pain and is seeing the spine     Pain Assessment  8/22/2018   Location of Pain Toe   Location Modifiers Right   Severity of Pain 3   Quality of Pain Aching; Throbbing   Duration of Pain A few hours   Frequency of Pain Intermittent   Aggravating Factors Walking;Standing   Aggravating Factors Comment -   Limiting Behavior -   Relieving Factors Rest   Result of Injury Yes   Work-Related Injury No   How long out of work?  -   Type of Injury Fall   Type of Injury Comment -     Occupation, etc:  Ms. Haydee Quiñones is an 8th grade  at [employer]. She starts back at school next week. She lives in [place] with her 24yo son and 2 dogs--Shamir 62 and lab rottweiler mix. Current weight is 176 pounds. She is 5'3\" tall. Her daughter attends [employer]. She has been under a lot of stress recently with a divorce settlement hearing coming up. No results found for: HBA1C, HGBE8, QZE4EYCH, QRQ5KSSC, ULS4YGSS  Weight Metrics 8/22/2018 7/27/2018 7/23/2018 6/29/2018 4/4/2017 7/17/2015 8/28/2013   Weight 180 lb 178 lb 12.8 oz 180 lb 6.4 oz 176 lb 6.4 oz 175 lb 180 lb 167 lb   BMI 31.89 kg/m2 31.67 kg/m2 31.96 kg/m2 31.25 kg/m2 31 kg/m2 31.89 kg/m2 29.59 kg/m2       Patient Active Problem List   Diagnosis Code    Rectal pain K62.89    Hemorrhoids K64.9    Rectocele N81.6    Bright red rectal bleeding K62.5    DDD (degenerative disc disease), lumbar M51.36    Lumbar neuritis M54.16    Lumbosacral spondylosis without myelopathy M47.817    Cervical pain M54.2     REVIEW OF SYSTEMS: All Below are Negative except: See HPI   Constitutional: negative for fever, chills, and weight loss. Cardiovascular: negative for chest pain, claudication, leg swelling, SOB, YEUNG   Gastrointestinal: Negative for pain, N/V/C/D, Blood in stool or urine, dysuria,  hematuria, incontinence, pelvic pain. Musculoskeletal: See HPI   Neurological: Negative for dizziness and weakness. Negative for headaches, Visual changes, confusion, seizures   Phychiatric/Behavioral: Negative for depression, memory loss, substance  abuse. Extremities: Negative for hair changes, rash, or skin lesion changes. Hematologic: Negative for bleeding problems, bruising, pallor or swollen lymph  nodes   Peripheral Vascular: No calf pain, no circulation deficits. Social History     Social History    Marital status: SINGLE     Spouse name: N/A    Number of children: N/A    Years of education: N/A     Occupational History    Not on file.      Social History Main Topics    Smoking status: Never Smoker    Smokeless tobacco: Never Used    Alcohol use Yes      Comment: occassionally    Drug use: No    Sexual activity: Not on file     Other Topics Concern    Not on file     Social History Narrative      Allergies   Allergen Reactions    Codeine Hives and Nausea Only    Sulfa (Sulfonamide Antibiotics) Hives      Current Outpatient Prescriptions   Medication Sig    diclofenac (VOLTAREN) 1 % gel Apply 4 g to affected area four (4) times daily.  ibuprofen (MOTRIN) 400 mg tablet Take  by mouth every six (6) hours as needed for Pain.  Cetirizine (ZYRTEC) 10 mg cap Take  by mouth.  LEVOTHYROXINE SODIUM (LEVOTHYROXINE PO) Take  by mouth daily.  FLUoxetine (PROZAC) 20 mg capsule Take 20 mg by mouth daily.  traZODone (DESYREL) 50 mg tablet Take  by mouth nightly.  ranitidine (ZANTAC) 150 mg tablet Take 150 mg by mouth daily. No current facility-administered medications for this visit.        PHYSICAL EXAMINATION:  Visit Vitals    /57    Pulse 84    Temp 96.9 °F (36.1 °C) (Oral)    Resp 16    Ht 5' 3\" (1.6 m)    Wt 180 lb (81.6 kg)    SpO2 95%    BMI 31.89 kg/m2      ORTHO EXAMINATION:  Examination Right knee Left knee   Skin Intact Intact   Range of motion 120-0 120-0   Effusion - -   Medial joint line tenderness - + pes anserinus   Lateral joint line tenderness - -   Popliteal tenderness - -   Osteophytes palpable - +   Nelsons - -   Patella crepitus - +   Anterior drawer - -   Lateral laxity - -   Medial laxity - -   Varus deformity - -   Valgus deformity - -   Pretibial edema +1 +1   Calf tenderness - -     Examination Right Ankle/Foot Left Ankle/Foot   Skin Intact Intact   Swelling 3rd toe -   Dorsiflexion 10 10   Plantarflexion 25 25   Deformity - -   Inversion laxity - -   Anterior drawer - -   Medial tenderness - -   Lateral tenderness - -   Heel cord Intact Intact   Sensation Intact Intact   Bunion - -   Toe nails Normal Normal   Capillary refill Normal Normal     MRI LT KNEE 7/11/18  IMPRESSION[de-identified]  1. Mild soft tissue edema and small joint effusion. 2. Intact menisci. 3. Intact ligaments. 4. Intact cartilage. 5. Mild proximal patellar tendinosis. RADIOGRAPHS:   XR RT FOOT 7/27/18 [facility]  IMPRESSION:  Three views - healing proximal phalynx fracture of right 3rd toe, no bunion deformity, no heel spur. XR RT FOOT 7/27/18 [facility]  IMPRESSION:  Three views - Proximal phalynx fracture of right 3rd toe, no bunion deformity, no heel spur. XR LT KNEE 6/29/2018 [facility]  IMPRESSION:  Three views - No fractures, no effusion, mild joint space narrowing, small osteophytes present. IKDC Grade B. Mild squaring of condyles    IMPRESSION:      ICD-10-CM ICD-9-CM    1. Pes anserinus bursitis of left knee M70.52 726.61 diclofenac (VOLTAREN) 1 % gel   2. Chronic pain of left knee M25.562 719.46 diclofenac (VOLTAREN) 1 % gel    G89.29 338.29    3. Primary osteoarthritis of left knee M17.12 715.16 diclofenac (VOLTAREN) 1 % gel   4. Right foot pain M79.671 729.5 AMB POC XRAY, FOOT; COMPLETE, 3+ VIEW     PLAN: There is no need for surgery at this time. She wishes to avoid any further injections. She was provided a prescription for Voltaren gel for her left knee bursitis. New [place] tape provided. She will follow up in 4 weeks with re-xray.      Scribed by Yehuda Toledo [facility]) as dictated by Yaima Peters MD

## 2018-09-19 ENCOUNTER — OFFICE VISIT (OUTPATIENT)
Dept: ORTHOPEDIC SURGERY | Facility: CLINIC | Age: 53
End: 2018-09-19

## 2018-09-19 VITALS
BODY MASS INDEX: 32.43 KG/M2 | WEIGHT: 183 LBS | HEART RATE: 90 BPM | RESPIRATION RATE: 18 BRPM | SYSTOLIC BLOOD PRESSURE: 102 MMHG | DIASTOLIC BLOOD PRESSURE: 63 MMHG | OXYGEN SATURATION: 97 % | TEMPERATURE: 97.9 F | HEIGHT: 63 IN

## 2018-09-19 DIAGNOSIS — S92.515D CLOSED NONDISPLACED FRACTURE OF PROXIMAL PHALANX OF LESSER TOE OF LEFT FOOT WITH ROUTINE HEALING, SUBSEQUENT ENCOUNTER: Primary | ICD-10-CM

## 2018-09-19 NOTE — PROGRESS NOTES
Patient: Gayla Jones                MRN: [id number]       SSN: [id number] YOB: 1965        AGE: 52 y.o. SEX: female PCP: Maycol Woodson MD 
09/19/18 09/19/18: Follow up pain in Right Foot Chief Complaint Patient presents with  Foot Pain Right HISTORY:  Gayla Jones is a 52 y.o. female who is seen for recurrent left knee pain. She has been experiencing pain for the past few months. Her knee injection last ov helped only for about one week. She sustained a left knee injury in 12/2017. She twisted her knee when she was wearing heeled boots while at work at "[employer]" in [place], [place]. She did not report this injury as a work injury. She noticed an immediate pop and an instant pain. She ignored her pain and limped back to class. She reports that her leg was swollen for 2-3 days 7 days after the injury. She reports continued pain when she bends her knees in certain ways. She reports previous intermittent pain. She has been experiencing pain and discomfort in her left knee since. She reports pain when bending and sitting Wesley style. She previously sustained an inversion stress right foot injury on July 6th. She states she inverted her ankle while washing her rotweiler lab mix dog. She states she has been experiencing toe pain and is unable to bend her toes. She also experiences some right lower back pain and is seeing the spine Pain Assessment  9/19/2018 Location of Pain Foot Location Modifiers Right Severity of Pain 0 Quality of Pain - Duration of Pain - Frequency of Pain - Aggravating Factors Walking Aggravating Factors Comment - Limiting Behavior Some Relieving Factors - Result of Injury Yes Work-Related Injury No  
How long out of work? -  
Type of Injury Fall Type of Injury Comment -  
 
Occupation, etc:  Ms. Reynaldo Dugan is an 8th grade  at [employer]. She starts back at school next week. She lives in [facility] [place] [facility] with her 24yo son and 2 dogs--Shamir 62 and lab rottweiler mix. Current weight is 176 pounds. She is 5'3\" tall. Her daughter attends [employer]. She has been under a lot of stress recently with a divorce settlement hearing coming up. No results found for: HBA1C, HGBE8, IRC4GHGD, MAC1TVAH, BHL9AFNR Weight Metrics 9/19/2018 8/22/2018 7/27/2018 7/23/2018 6/29/2018 4/4/2017 7/17/2015 Weight 183 lb 180 lb 178 lb 12.8 oz 180 lb 6.4 oz 176 lb 6.4 oz 175 lb 180 lb BMI 32.42 kg/m2 31.89 kg/m2 31.67 kg/m2 31.96 kg/m2 31.25 kg/m2 31 kg/m2 31.89 kg/m2 Patient Active Problem List  
Diagnosis Code  Rectal pain K62.89  
 Hemorrhoids K64.9  Rectocele N81.6  Bright red rectal bleeding K62.5  DDD (degenerative disc disease), lumbar M51.36  
 Lumbar neuritis M54.16  
 Lumbosacral spondylosis without myelopathy M47.817  Cervical pain M54.2 REVIEW OF SYSTEMS: All Below are Negative except: See HPI Constitutional: negative for fever, chills, and weight loss. Cardiovascular: negative for chest pain, claudication, leg swelling, SOB, YEUNG Mabeline Sink Musculoskeletal: See HPI Neurological: Negative for dizziness and weakness. Extremities: Negative for hair changes, rash, or skin lesion changes. Social History Social History  Marital status: SINGLE Spouse name: N/A  
 Number of children: N/A  
 Years of education: N/A Occupational History  Not on file. Social History Main Topics  Smoking status: Never Smoker  Smokeless tobacco: Never Used  Alcohol use Yes Comment: occassionally  Drug use: No  
 Sexual activity: Not on file Other Topics Concern  Not on file Social History Narrative Allergies Allergen Reactions  Codeine Hives and Nausea Only  Sulfa (Sulfonamide Antibiotics) Hives Current Outpatient Prescriptions Medication Sig  
  diclofenac (VOLTAREN) 1 % gel Apply 4 g to affected area four (4) times daily.  ibuprofen (MOTRIN) 400 mg tablet Take  by mouth every six (6) hours as needed for Pain.  Cetirizine (ZYRTEC) 10 mg cap Take  by mouth.  FLUoxetine (PROZAC) 20 mg capsule Take 20 mg by mouth daily.  traZODone (DESYREL) 50 mg tablet Take  by mouth nightly.  ranitidine (ZANTAC) 150 mg tablet Take 150 mg by mouth daily.  LEVOTHYROXINE SODIUM (LEVOTHYROXINE PO) Take  by mouth daily. No current facility-administered medications for this visit. PHYSICAL EXAMINATION: 
Visit Vitals  /63  Pulse 90  Temp 97.9 °F (36.6 °C) (Oral)  Resp 18  Ht 5' 3\" (1.6 m)  Wt 183 lb (83 kg)  SpO2 97%  BMI 32.42 kg/m2 ORTHO EXAMINATION: 
    
    
    
    
    
    
    
    
    
    
    
    
    
    
    
    
    
 
Examination Right Ankle/Foot Skin Intact Swelling 3rd toe resolved Dorsiflexion 10 Plantarflexion 25 Deformity - Inversion laxity - Anterior drawer - Medial tenderness - Lateral tenderness - Heel cord Intact Sensation Intact Bunion - Toe nails Normal   
Capillary refill Normal   
 
MRI LT KNEE 7/11/18 IMPRESSION[de-identified] 
1. Mild soft tissue edema and small joint effusion. 2. Intact menisci. 3. Intact ligaments. 4. Intact cartilage. 5. Mild proximal patellar tendinosis. RADIOGRAPHS:  
XR RT FOOT 7/27/18 [facility] with repeated imaging on 19 Sept 18 revealing slow callous inlay associated with below fracture. IMPRESSION:  Three views - healing proximal phalynx fracture of right 3rd toe, no bunion deformity, no heel spur. XR LT KNEE 6/29/2018 [facility] IMPRESSION:  Three views - No fractures, no effusion, mild joint space narrowing, small osteophytes present. IKDC Grade B. Mild squaring of condyles IMPRESSION:   
  ICD-10-CM ICD-9-CM 1. Closed nondisplaced fracture of proximal phalanx of lesser toe of left foot with routine healing, subsequent encounter S92.515D V54.19 PLAN: Firm sole tennis shoe x 1 month, Tylenol / Motrin for pain per manufactures recommendations, No running nor jumping. F/u 4 weeks. X rays reviewed all questions answered and X ray copies provided.

## 2018-09-19 NOTE — LETTER
9/19/2018 4:29 PM 
 
Ms. Viet [address] [facility] [id number] To whom it may concern: 
 
Please allow her to wear tennis shoes at work for one month.  
 
 
Sincerely, 
 
 
Karen Bullock PA-C

## 2018-10-16 ENCOUNTER — OFFICE VISIT (OUTPATIENT)
Dept: ORTHOPEDIC SURGERY | Facility: CLINIC | Age: 53
End: 2018-10-16

## 2018-10-16 VITALS
HEIGHT: 63 IN | WEIGHT: 183 LBS | RESPIRATION RATE: 16 BRPM | HEART RATE: 75 BPM | SYSTOLIC BLOOD PRESSURE: 109 MMHG | DIASTOLIC BLOOD PRESSURE: 75 MMHG | OXYGEN SATURATION: 94 % | BODY MASS INDEX: 32.43 KG/M2 | TEMPERATURE: 95.8 F

## 2018-10-16 DIAGNOSIS — S92.515D CLOSED NONDISPLACED FRACTURE OF PROXIMAL PHALANX OF LESSER TOE OF LEFT FOOT WITH ROUTINE HEALING, SUBSEQUENT ENCOUNTER: Primary | ICD-10-CM

## 2018-10-16 NOTE — PROGRESS NOTES
Patient: Fredy Delatorre                MRN: [id number]       SSN: [id number]  YOB: 1965        AGE: 52 y.o. SEX: female    PCP: Nicole Gaona MD  10/16/18     10/16/18: Follow up Right foot 3rd proximal toe, pain improved    09/19/18: Follow up pain in Right Foot    Chief Complaint   Patient presents with    Follow-up     1 month follow up right middle toe     HISTORY:  Fredy Delatorre is a 52 y.o. female who is seen for recurrent left knee pain. She has been experiencing pain for the past few months. Her knee injection last ov helped only for about one week. She sustained a left knee injury in 12/2017. She twisted her knee when she was wearing heeled boots while at work at [employer] in [place], [place]. She did not report this injury as a work injury. She noticed an immediate pop and an instant pain. She ignored her pain and limped back to class. She reports that her leg was swollen for 2-3 days 7 days after the injury. She reports continued pain when she bends her knees in certain ways. She reports previous intermittent pain. She has been experiencing pain and discomfort in her left knee since. She reports pain when bending and sitting Wesley style. She previously sustained an inversion stress right foot injury on July 6th. She states she inverted her ankle while washing her rotweiler lab mix dog. She states she has been experiencing toe pain and is unable to bend her toes. She also experiences some right lower back pain and is seeing the spine     Pain Assessment  10/16/2018   Location of Pain Toe   Location Modifiers Right   Severity of Pain 0   Quality of Pain -   Duration of Pain -   Frequency of Pain -   Aggravating Factors -   Aggravating Factors Comment -   Limiting Behavior -   Relieving Factors -   Result of Injury -   Work-Related Injury -   How long out of work?  -   Type of Injury -   Type of Injury Comment -     Occupation, etc:  Ms. Scott Hauser is an 8th grade  at [employer]. She starts back at school next week. She lives in [place] [place] with her 24yo son and 2 dogs--Shamir 62 and lab rottweiler mix. Current weight is 176 pounds. She is 5'3\" tall. Her daughter attends [employer]. She has been under a lot of stress recently with a divorce settlement hearing coming up. No results found for: HBA1C, HGBE8, VGD4QWKJ, ENX1IJGS, TZI3RVPU  Weight Metrics 10/16/2018 9/19/2018 8/22/2018 7/27/2018 7/23/2018 6/29/2018 4/4/2017   Weight 183 lb 183 lb 180 lb 178 lb 12.8 oz 180 lb 6.4 oz 176 lb 6.4 oz 175 lb   BMI 32.42 kg/m2 32.42 kg/m2 31.89 kg/m2 31.67 kg/m2 31.96 kg/m2 31.25 kg/m2 31 kg/m2       Patient Active Problem List   Diagnosis Code    Rectal pain K62.89    Hemorrhoids K64.9    Rectocele N81.6    Bright red rectal bleeding K62.5    DDD (degenerative disc disease), lumbar M51.36    Lumbar neuritis M54.16    Lumbosacral spondylosis without myelopathy M47.817    Cervical pain M54.2     REVIEW OF SYSTEMS: All Below are Negative except: See HPI   Constitutional: negative for fever, chills, and weight loss. Cardiovascular: negative for chest pain, claudication, leg swelling, SOB, YEUNG   . Musculoskeletal: See HPI   Neurological: Negative for dizziness and weakness. Extremities: Negative for hair changes, rash, or skin lesion changes. Social History     Social History    Marital status: SINGLE     Spouse name: N/A    Number of children: N/A    Years of education: N/A     Occupational History    Not on file.      Social History Main Topics    Smoking status: Never Smoker    Smokeless tobacco: Never Used    Alcohol use Yes      Comment: occassionally    Drug use: No    Sexual activity: Not on file     Other Topics Concern    Not on file     Social History Narrative      Allergies   Allergen Reactions    Codeine Hives and Nausea Only    Sulfa (Sulfonamide Antibiotics) Hives      Current Outpatient Prescriptions Medication Sig    diclofenac (VOLTAREN) 1 % gel Apply 4 g to affected area four (4) times daily.  ibuprofen (MOTRIN) 400 mg tablet Take  by mouth every six (6) hours as needed for Pain.  Cetirizine (ZYRTEC) 10 mg cap Take  by mouth.  LEVOTHYROXINE SODIUM (LEVOTHYROXINE PO) Take  by mouth daily.  FLUoxetine (PROZAC) 20 mg capsule Take 20 mg by mouth daily.  traZODone (DESYREL) 50 mg tablet Take  by mouth nightly.  ranitidine (ZANTAC) 150 mg tablet Take 150 mg by mouth daily.  FLUZONE QUAD [id number], PF, syrg injection TO BE ADMINISTERED BY PHARMACIST FOR IMMUNIZATION     No current facility-administered medications for this visit. PHYSICAL EXAMINATION:  Visit Vitals    /75 (BP 1 Location: Left arm, BP Patient Position: Sitting)    Pulse 75    Temp 95.8 °F (35.4 °C) (Oral)    Resp 16    Ht 5' 3\" (1.6 m)    Wt 183 lb (83 kg)    SpO2 94%    BMI 32.42 kg/m2      ORTHO EXAMINATION:                                                                                         Examination Right Ankle/Foot    Skin Intact    Swelling 3rd toe resolved    Dorsiflexion 10    Plantarflexion 25    Deformity -    Inversion laxity -    Anterior drawer -    Medial tenderness -    Lateral tenderness -    Heel cord Intact    Sensation Intact    Bunion -    Toe nails Normal    Capillary refill Normal      MRI LT KNEE 7/11/18  IMPRESSION[de-identified]  1. Mild soft tissue edema and small joint effusion. 2. Intact menisci. 3. Intact ligaments. 4. Intact cartilage. 5. Mild proximal patellar tendinosis. RADIOGRAPHS:   XR RT FOOT 7/27/18 [facility] with repeated imaging on 19 Sept 18 revealing slow callous inlay associated with below fracture and repeated imaging on 10/16/18 reflecting improving callous inlay in below fracture. IMPRESSION:  Three views - healing proximal phalynx fracture of right 3rd toe, no bunion deformity, no heel spur.         XR LT KNEE 6/29/2018 [facility]  IMPRESSION:  Three views - No fractures, no effusion, mild joint space narrowing, small osteophytes present. IKDC Grade B. Mild squaring of condyles    IMPRESSION:      ICD-10-CM ICD-9-CM    1. Closed nondisplaced fracture of proximal phalanx of lesser toe of left foot with routine healing, subsequent encounter S92.515D V54.19 POC XRAY, FOOT; 2 VIEWS     PLAN: The patient will DC shoes, flat, and DC any limitations, F/u PRN, X rays reviewed and copies provided.

## 2022-03-19 PROBLEM — M51.36 DDD (DEGENERATIVE DISC DISEASE), LUMBAR: Status: ACTIVE | Noted: 2018-07-23

## 2022-03-19 PROBLEM — M51.369 DDD (DEGENERATIVE DISC DISEASE), LUMBAR: Status: ACTIVE | Noted: 2018-07-23

## 2022-03-19 PROBLEM — M54.16 LUMBAR NEURITIS: Status: ACTIVE | Noted: 2018-07-23

## 2022-03-19 PROBLEM — M47.817 LUMBOSACRAL SPONDYLOSIS WITHOUT MYELOPATHY: Status: ACTIVE | Noted: 2018-07-23

## 2022-03-20 PROBLEM — M54.2 CERVICAL PAIN: Status: ACTIVE | Noted: 2018-07-23

## 2023-11-08 ENCOUNTER — TELEPHONE (OUTPATIENT)
Facility: HOSPITAL | Age: 58
End: 2023-11-08

## 2023-11-20 ENCOUNTER — TELEPHONE (OUTPATIENT)
Facility: HOSPITAL | Age: 58
End: 2023-11-20

## 2023-12-05 ENCOUNTER — HOSPITAL ENCOUNTER (OUTPATIENT)
Facility: HOSPITAL | Age: 58
Setting detail: RECURRING SERIES
Discharge: HOME | End: 2023-12-08
Payer: COMMERCIAL

## 2023-12-05 PROCEDURE — 97162 PT EVAL MOD COMPLEX 30 MIN: CPT

## 2023-12-05 PROCEDURE — 97535 SELF CARE MNGMENT TRAINING: CPT

## 2023-12-06 NOTE — PROGRESS NOTES
PHYSICAL / OCCUPATIONAL THERAPY - DAILY TREATMENT NOTE (updated )  For Eval visit    Patient Name: Mercy Fajardo    Date: 2023    : 1965  Insurance: Payor: Tashi Rodriguez / Plan: [facility] / Product Type: *No Product type* /      Patient  verified yes     Visit #   Current / Total 1 16   Time   In / Out 335 420   Pain   In / Out 6 6   Subjective Functional Status/Changes: See POC     TREATMENT AREA =  see POC    OBJECTIVE          22 min   Eval - untimed                      Therapeutic Procedures: Tx Min Billable or 1:1 Min (if diff from Tx Min) Procedure, Rationale, Specifics   23  47226 Self Care/Home Management (timed):  improve patient knowledge and understanding of pain reducing techniques, activity modification, and diagnosis/prognosis  to improve patient's ability to progress to PLOF and address remaining functional goals.   (see flow sheet as applicable)     Details if applicable:              Details if applicable:            Details if applicable:            Details if applicable:            Details if applicable:     21  [facility] Totals Reminder: bill using total billable min of TIMED therapeutic procedures (example: do not include dry needle or estim unattended, both untimed codes, in totals to left)  8-22 min = 1 unit; 23-37 min = 2 units; 38-52 min = 3 units; 53-67 min = 4 units; 68-82 min = 5 units   Total Total     [x]  Patient Education billed concurrently with other procedures   [x] Review HEP    [] Progressed/Changed HEP, detail:    [] Other detail:       Objective Information/Functional Measures/Assessment    See POC    Patient will continue to benefit from skilled PT / OT services to modify and progress therapeutic interventions, analyze and address functional mobility deficits, analyze and address ROM deficits, analyze and address strength deficits, analyze and address soft tissue restrictions, analyze and cue for proper movement patterns, and analyze and
MEDIUM  Problem List: pain affecting function, decrease ROM, decrease strength, decrease ADL/functional abilities, decrease activity tolerance, and decrease flexibility/joint mobility   Treatment Plan may include any combination of the followin Therapeutic Exercise, 80304 Neuromuscular Re-Education, 23861 Manual Therapy, 85354 Therapeutic Activity, 24998 Self Care/Home Management, 45712 Electrical Stim unattended, and 36049 Ultrasound  Patient / Family readiness to learn indicated by: asking questions and trying to perform skills  Persons(s) to be included in education: patient (P)  Barriers to Learning/Limitations: none  Measures taken if barriers to learning present: None   Patient Goal (s):  To improve ROM , strength  and decrease Pain with ADLs and bending   Patient Self Reported Health Status: good  Rehabilitation Potential: good    Short Term Goals: To be accomplished in 3-4 weeks  1. Independent with HEP. EVAL: HEP established and given to patient      2. Decrease pain to  2/10 to assist with ADLs and bending   EVAL: 6/10      Long Term Goals: To be accomplished in 6-8 weeks:  1. Decrease pain to 0/10 to assist with with ADLs and bending   EVAL: 6/10      2. Improve FOTO Functional Status Score by 5 points in order to show significant functional improvement. EVAL: 63     3.  To improve  R Shoulder strength ( Flex/ Abduction ) to 5/5 in order to assist with ADLS and  Bending    Eval : R Shoulder strength ( Flex/ Abduction )  3/5     Frequency / Duration: Patient to be seen 1-2 times per week for 6-8 weeks    Patient/ Caregiver education and instruction: Diagnosis, prognosis, self care, activity modification, and exercises [x]  Plan of care has been reviewed with PTA    Certification Period: CRISTÓBAL Church, PT       2023       3:43 PM    Payor: Esperanza Mullins / Plan: Kurt Aldana / Product Type: *No Product type* /     No Physician signature required

## 2023-12-27 ENCOUNTER — HOSPITAL ENCOUNTER (OUTPATIENT)
Facility: HOSPITAL | Age: 58
Setting detail: RECURRING SERIES
Discharge: HOME | End: 2023-12-30
Payer: COMMERCIAL

## 2023-12-27 PROCEDURE — 97140 MANUAL THERAPY 1/> REGIONS: CPT

## 2023-12-27 PROCEDURE — 97110 THERAPEUTIC EXERCISES: CPT

## 2023-12-27 NOTE — PROGRESS NOTES
PHYSICAL / OCCUPATIONAL THERAPY - DAILY TREATMENT NOTE (updated )    Patient Name: Olive Gillette    Date: 2023    : 1965  Insurance: Payor: Manuel Schaefer / Plan: Dyan Castellon / Product Type: *No Product type* /      Patient  verified Yes     Visit #   Current / Total 3 16   Time   In / Out 410 455   Pain   In / Out 3 1   Subjective Functional Status/Changes: Patient complains of  pain in Neck , R arm and .R shoulder pain . TREATMENT AREA =  Pain in right arm [M79.601]  Neck pain [M54.2]    OBJECTIVE      Therapeutic Procedures: Tx Min Billable or 1:1 Min (if diff from Tx Min) Procedure, Rationale, Specifics   35  54183 Therapeutic Exercise (timed):  increase ROM, strength, coordination, balance, and proprioception to improve patient's ability to progress to PLOF and address remaining functional goals. (see flow sheet as applicable)     Details if applicable:       10  32741 Manual Therapy (timed):  decrease pain, increase ROM, increase tissue extensibility, and decrease trigger points to improve patient's ability to progress to PLOF and address remaining functional goals. The manual therapy interventions were performed at a separate and distinct time from the therapeutic activities interventions .  (see flow sheet as applicable)     Details if applicable:  STM on  Neck area , paraspinal muscles and B upper Trapezius , Gentle passive stretching of B upper Trapezius              Details if applicable:            Details if applicable:            Details if applicable:     39  [facility] Totals Reminder: bill using total billable min of TIMED therapeutic procedures (example: do not include dry needle or estim unattended, both untimed codes, in totals to left)  8-22 min = 1 unit; 23-37 min = 2 units; 38-52 min = 3 units; 53-67 min = 4 units; 68-82 min = 5 units   Total Total     [x]  Patient Education billed concurrently with other procedures   [x] Review HEP    []

## 2024-01-10 ENCOUNTER — HOSPITAL ENCOUNTER (OUTPATIENT)
Facility: HOSPITAL | Age: 59
Setting detail: RECURRING SERIES
Discharge: HOME | End: 2024-01-13
Payer: COMMERCIAL

## 2024-01-10 PROCEDURE — 97140 MANUAL THERAPY 1/> REGIONS: CPT

## 2024-01-10 PROCEDURE — G0283 ELEC STIM OTHER THAN WOUND: HCPCS

## 2024-01-10 PROCEDURE — 97535 SELF CARE MNGMENT TRAINING: CPT

## 2024-01-10 PROCEDURE — 97530 THERAPEUTIC ACTIVITIES: CPT

## 2024-01-10 NOTE — PROGRESS NOTES
[facility]  [address], [address], [place] [address]  Ph:[phone] Fx:[phone]  PHYSICAL THERAPY PROGRESS NOTE  Patient Name: Maggi Palacios : 1965   Treatment/Medical Diagnosis: Pain in right arm [M79.601]  Neck pain [M54.2]   Referral Source: Sylvia Baird,*     Date of Initial Visit: 2023 Attended Visits: 4 Missed Visits: 0     SUMMARY OF TREATMENT  PT intervention has consisted of therapeutic exercise, manual therapy, neuromuscular re-education and HEP to improve right shoulder and neck pain in order to improve patients tolerance for ADL's and job related tasks. MHP and ESTIM utilized for pain management.      CURRENT STATUS    Patient has only attended 4 total PT sessions since IE due to work schedule. Patient has noted increased cervical ROM and decreased pain but continues to have increased right shoulder pain. Reaching OH with right UE, looking up towards the ceiling and sleeping continues to be an issue for the patient. Patient reports 30% improvement with PT and will continue to benefit from skilled PT / OT services to modify and progress therapeutic interventions, analyze and address functional mobility deficits, analyze and address ROM deficits, analyze and address strength deficits, analyze and address soft tissue restrictions, analyze and cue for proper movement patterns, analyze and modify for postural abnormalities, and instruct in home and community integration to address functional deficits and attain remaining goals.     Short Term Goals: To be accomplished in 3-4 weeks  1. Independent with HEP.  EVAL: HEP established and given to patient   Current: Patient reports compliance with HEP once a day. MET      2. Decrease pain to  2/10 to assist with ADLs and bending   EVAL: 6/10   Current: Patient reports 3/10 pain today. Progressing     Long Term Goals: To be accomplished in 6-8 weeks:  1. Decrease pain to

## 2024-01-10 NOTE — PROGRESS NOTES
PHYSICAL / OCCUPATIONAL THERAPY - DAILY TREATMENT NOTE    Patient Name: Maggi Osborne Drain    Date: 1/10/2024    : 1965  Insurance: Payor: ROXY / Plan: [employer] / Product Type: *No Product type* /      Patient  verified Yes     Visit #   Current / Total 1 8   Time   In / Out 330 pm 432 pm    Pain   In / Out 3/10 0/10    Subjective Functional Status/Changes: Patient reports that her neck is feeling better but she is still having pain in her right shoulder.      TREATMENT AREA =  Pain in right arm [M79.601]  Neck pain [M54.2]    OBJECTIVE    Modalities Rationale:     decrease pain and increase tissue extensibility to improve patient's ability to progress to PLOF and address remaining functional goals.     min [] Estim Unattended, type/location:                                      []  w/ice    []  w/heat    min [] Estim Attended, type/location:                                     []  w/US     []  w/ice    []  w/heat    []  TENS insruct      min []  Mechanical Traction: type/lbs                   []  pro   []  sup   []  int   []  cont    []  before manual    []  after manual    min []  Ultrasound, settings/location:     10 min  unbill []  Ice     [x]  Heat    location/position: Reclined to right shoulder    min []  Paraffin,  details:     min []  Vasopneumatic Device, press/temp:     min []  Whirlpool / Fluido:    If using vaso (only need to measure limb vaso being performed on)      pre-treatment girth :       post-treatment girth :       measured at (landmark location) :      min []  Other:    Skin assessment post-treatment:   Intact      Therapeutic Procedures:    Tx Min Billable or 1:1 Min (if diff from Tx Min) Procedure, Rationale, Specifics   22  03274 Therapeutic Activity (timed):  use of dynamic activities replicating functional movements to increase ROM, strength, coordination, balance, and proprioception in order to improve patient's ability to progress to PLOF and address

## 2024-01-17 ENCOUNTER — HOSPITAL ENCOUNTER (OUTPATIENT)
Facility: HOSPITAL | Age: 59
Setting detail: RECURRING SERIES
Discharge: HOME | End: 2024-01-20
Payer: COMMERCIAL

## 2024-01-17 PROCEDURE — G0283 ELEC STIM OTHER THAN WOUND: HCPCS

## 2024-01-17 PROCEDURE — 97140 MANUAL THERAPY 1/> REGIONS: CPT

## 2024-01-17 PROCEDURE — 97110 THERAPEUTIC EXERCISES: CPT

## 2024-01-17 PROCEDURE — 97530 THERAPEUTIC ACTIVITIES: CPT

## 2024-01-17 NOTE — PROGRESS NOTES
PHYSICAL / OCCUPATIONAL THERAPY - DAILY TREATMENT NOTE    Patient Name: Maggi Osborne Drain    Date: 2024    : 1965  Insurance: Payor: ROXY / Plan: [employer] / Product Type: *No Product type* /      Patient  verified Yes     Visit #   Current / Total 2 8   Time   In / Out 338 pm 445 pm    Pain   In / Out 3/10  010    Subjective Functional Status/Changes: Patient reports that her shoulder was bothering her a lot last night after moving her arm the wrong way. Patient states that her shoulder is feeling a little better today.      TREATMENT AREA =  Pain in right arm [M79.601]  Neck pain [M54.2]    OBJECTIVE    Modalities Rationale:     decrease pain and increase tissue extensibility to improve patient's ability to progress to PLOF and address remaining functional goals.    15 min [] Estim Unattended, type/location: IFC to C/S                                      []  w/ice    [x]  w/heat    min [] Estim Attended, type/location:                                     []  w/US     []  w/ice    []  w/heat    []  TENS insruct      min []  Mechanical Traction: type/lbs                   []  pro   []  sup   []  int   []  cont    []  before manual    []  after manual    min []  Ultrasound, settings/location:      min  unbill []  Ice     []  Heat    location/position:     min []  Paraffin,  details:     min []  Vasopneumatic Device, press/temp:     min []  Whirlpool / Fluido:    If using vaso (only need to measure limb vaso being performed on)      pre-treatment girth :       post-treatment girth :       measured at (landmark location) :      min []  Other:    Skin assessment post-treatment:   Intact      Therapeutic Procedures:    Tx Min Billable or 1:1 Min (if diff from Tx Min) Procedure, Rationale, Specifics   18  43921 Therapeutic Exercise (timed):  increase ROM, strength, coordination, balance, and proprioception to improve patient's ability to progress to PLOF and address remaining

## 2024-01-24 ENCOUNTER — HOSPITAL ENCOUNTER (OUTPATIENT)
Facility: HOSPITAL | Age: 59
Setting detail: RECURRING SERIES
Discharge: HOME | End: 2024-01-27
Payer: COMMERCIAL

## 2024-01-24 PROCEDURE — 97110 THERAPEUTIC EXERCISES: CPT

## 2024-01-24 PROCEDURE — 97530 THERAPEUTIC ACTIVITIES: CPT

## 2024-01-24 PROCEDURE — 97140 MANUAL THERAPY 1/> REGIONS: CPT

## 2024-01-24 NOTE — PROGRESS NOTES
visits  2024           PLAN  - Upgrade activities as tolerated    Lesly [place] PTA    2024    4:31 PM    Future Appointments   Date Time Provider Department Center   2024  3:30 PM Pawan Stern PTA [id number]

## 2024-01-31 ENCOUNTER — HOSPITAL ENCOUNTER (OUTPATIENT)
Facility: HOSPITAL | Age: 59
Setting detail: RECURRING SERIES
Discharge: HOME | End: 2024-02-03
Payer: COMMERCIAL

## 2024-01-31 PROCEDURE — G0283 ELEC STIM OTHER THAN WOUND: HCPCS

## 2024-01-31 PROCEDURE — 97110 THERAPEUTIC EXERCISES: CPT

## 2024-01-31 PROCEDURE — 97530 THERAPEUTIC ACTIVITIES: CPT

## 2024-01-31 NOTE — PROGRESS NOTES
PHYSICAL / OCCUPATIONAL THERAPY - DAILY TREATMENT NOTE    Patient Name: Maggi Osborne Drain    Date: 2024    : 1965  Insurance: Payor: ROXY / Plan: [employer] / Product Type: *No Product type* /      Patient  verified Yes     Visit #   Current / Total 4 8   Time   In / Out 336 pm  425 pm    Pain   In / Out 0/10  0/10    Subjective Functional Status/Changes: \"My neck and shoulder do not hurt when I'm not moving it.\" Patient explains that her shoulder hurts when reaching her arm back.      TREATMENT AREA =  Pain in right arm [M79.601]  Neck pain [M54.2]    OBJECTIVE    Modalities Rationale:     decrease pain and increase tissue extensibility to improve patient's ability to progress to PLOF and address remaining functional goals.    15 min [] Estim Unattended, type/location: IFC to C/S                                      []  w/ice    [x]  w/heat    min [] Estim Attended, type/location:                                     []  w/US     []  w/ice    []  w/heat    []  TENS insruct      min []  Mechanical Traction: type/lbs                   []  pro   []  sup   []  int   []  cont    []  before manual    []  after manual    min []  Ultrasound, settings/location:      min  unbill []  Ice     []  Heat    location/position:     min []  Paraffin,  details:     min []  Vasopneumatic Device, press/temp:     min []  Whirlpool / Fluido:    If using vaso (only need to measure limb vaso being performed on)      pre-treatment girth :       post-treatment girth :       measured at (landmark location) :      min []  Other:    Skin assessment post-treatment:   Intact      Therapeutic Procedures:    Tx Min Billable or 1:1 Min (if diff from Tx Min) Procedure, Rationale, Specifics   22  46576 Therapeutic Activity (timed):  use of dynamic activities replicating functional movements to increase ROM, strength, coordination, balance, and proprioception in order to improve patient's ability to progress to PLOF

## 2024-02-07 ENCOUNTER — HOSPITAL ENCOUNTER (OUTPATIENT)
Facility: HOSPITAL | Age: 59
Setting detail: RECURRING SERIES
Discharge: HOME | End: 2024-02-10
Payer: COMMERCIAL

## 2024-02-07 PROCEDURE — 97140 MANUAL THERAPY 1/> REGIONS: CPT

## 2024-02-07 PROCEDURE — 97530 THERAPEUTIC ACTIVITIES: CPT

## 2024-02-07 NOTE — PROGRESS NOTES
[facility]  [address], [address], [place] [address]  Ph:[phone] Fx:[phone]  PHYSICAL THERAPY PROGRESS NOTE  Patient Name: Maggi Palacios : 1965   Treatment/Medical Diagnosis: Pain in right arm [M79.601]  Neck pain [M54.2]   Referral Source: Sylvia Baird,*     Date of Initial Visit: 2023 Attended Visits: 8 Missed Visits: 0     SUMMARY OF TREATMENT  PT intervention has consisted of therapeutic exercise, manual therapy, neuromuscular re-education and HEP to improve right shoulder and neck pain in order to improve patients tolerance for ADL's and job related tasks. MHP and ESTIM utilized for pain management.      CURRENT STATUS    Patient has only attended 8 total PT sessions and missed 0 since IE due to work schedule. Patient has noted increased cervical ROM and decreased pain but continues to have increased right shoulder pain. Reaching back/behind her body with right UE, looking up towards the ceiling and sleeping continues to be an issue for the patient. Patient reports 50% improvement with PT and will continue to benefit from skilled PT / OT services to modify and progress therapeutic interventions, analyze and address functional mobility deficits, analyze and address ROM deficits, analyze and address strength deficits, analyze and address soft tissue restrictions, analyze and cue for proper movement patterns, analyze and modify for postural abnormalities, and instruct in home and community integration to address functional deficits and attain remaining goals.       Short Term Goals: To be accomplished in 3-4 weeks   1. Decrease pain to  2/10 to assist with ADLs and bending   PN: 3/10   Current: Patient reports 2/10 pain today. progressing - 24     Long Term Goals: To be accomplished in 6-8 weeks:  1. Decrease pain to 0/10 to assist with with ADLs and bending   PN: 3/10   Current: Patient reports 2/10 pain today. 
  [x] Review HEP    [] Progressed/Changed HEP, detail:    [] Other detail:       Objective Information/Functional Measures/Assessment    See PN. Performed what exercises we could today. Added shoulder ER and IR due to weakness noted during MMT.     Patient will continue to benefit from skilled PT / OT services to modify and progress therapeutic interventions, analyze and address functional mobility deficits, analyze and address ROM deficits, analyze and address strength deficits, analyze and address soft tissue restrictions, analyze and cue for proper movement patterns, analyze and modify for postural abnormalities, and instruct in home and community integration to address functional deficits and attain remaining goals.    Progress toward goals / Updated goals:  [x]  See Progress Note/Recertification    Short Term Goals: To be accomplished in 3-4 weeks   1. Decrease pain to  2/10 to assist with ADLs and bending   Current: Patient reports 2/10 pain today. progressing - 24     Long Term Goals: To be accomplished in 6-8 weeks:  1. Decrease pain to 0/10 to assist with with ADLs and bending   Current: Patient reports 2/10 pain today. Progressing - 24     2. Improve FOTO Functional Status Score by 5 points to 68 point in order to show significant functional improvement.  Current - 67 points. Progressing 24     3. To improve  R Shoulder strength ( Flex/ Abduction ) to 5/5 in order to assist with ADLS and  Bending.   Current -  R shoulder MMT: Flexion: 4+/5 painful; Abduction: 4+/5. IR: 4/5 painful, ER: 4/5 - progressing 24     Frequency / Duration:  Patient to be seen   1-2   times per week for   3    weeks:    Next PN due 3/6/2024  Auth due 30 visits  2024    PLAN  - Continue Plan of Care  - Upgrade activities as tolerated    Jackie Christianson PT    2024    10:01 AM    Future Appointments   Date Time Provider Department Center   2024  4:10 PM Jackie Christianson PT [facility]

## 2024-02-13 ENCOUNTER — HOSPITAL ENCOUNTER (OUTPATIENT)
Facility: HOSPITAL | Age: 59
Setting detail: RECURRING SERIES
End: 2024-02-13
Payer: COMMERCIAL

## 2024-02-21 ENCOUNTER — HOSPITAL ENCOUNTER (OUTPATIENT)
Facility: HOSPITAL | Age: 59
Setting detail: RECURRING SERIES
Discharge: HOME | End: 2024-02-24
Payer: COMMERCIAL

## 2024-02-21 PROCEDURE — 97110 THERAPEUTIC EXERCISES: CPT

## 2024-02-21 PROCEDURE — 97140 MANUAL THERAPY 1/> REGIONS: CPT

## 2024-02-21 NOTE — PROGRESS NOTES
PHYSICAL / OCCUPATIONAL THERAPY - DAILY TREATMENT NOTE    Patient Name: Maggi Osborne Drain    Date: 2024    : 1965  Insurance: Payor: ROXY / Plan: [employer] / Product Type: *No Product type* /      Patient  verified Yes     Visit #   Current / Total 1 3-6   Time   In / Out 3:39 4:14   Pain   In / Out 2 0   Subjective Functional Status/Changes: Pt reports she has most of her pain when she reaches back and externally rotates, to grab tissues in bed.      TREATMENT AREA =  Pain in right arm [M79.601]  Neck pain [M54.2]  OBJECTIVE  Therapeutic Procedures:  Tx Min Billable or 1:1 Min (if diff from Tx Min) Procedure, Rationale, Specifics   20  46018 Therapeutic Exercise (timed):  increase ROM, strength, coordination, balance, and proprioception to improve patient's ability to progress to PLOF and address remaining functional goals.     Per flow sheet, progressed all exercises with min cues for posture.       15  47343 Manual Therapy (timed):  decrease pain, increase ROM, increase tissue extensibility, decrease trigger points, and increase postural awareness to improve patient's ability to progress to PLOF and address remaining functional goals.  The manual therapy interventions were performed at a separate and distinct time from the therapeutic activities interventions . (see flow sheet as applicable)     Details if applicable:  trigger point massage and stretch of all scalenes, levator scap and axillary space with PROM in all directions for neck and shoulder ROM and to decrease pain and spasm.    35  [facility] Totals Reminder: bill using total billable min of TIMED therapeutic procedures (example: do not include dry needle or estim unattended, both untimed codes, in totals to left)  8-22 min = 1 unit; 23-37 min = 2 units; 38-52 min = 3 units; 53-67 min = 4 units; 68-82 min = 5 units   Total Total     [x]  Patient Education billed concurrently with other procedures   [x] Review HEP    []

## 2024-02-28 ENCOUNTER — HOSPITAL ENCOUNTER (OUTPATIENT)
Facility: HOSPITAL | Age: 59
Setting detail: RECURRING SERIES
Discharge: HOME | End: 2024-03-02
Payer: COMMERCIAL

## 2024-02-28 PROCEDURE — 97530 THERAPEUTIC ACTIVITIES: CPT

## 2024-02-28 PROCEDURE — 97110 THERAPEUTIC EXERCISES: CPT

## 2024-02-28 PROCEDURE — 97140 MANUAL THERAPY 1/> REGIONS: CPT

## 2024-02-28 NOTE — PROGRESS NOTES
applicable)     Details if applicable:       15  54481 Therapeutic Activity (timed):  use of dynamic activities replicating functional movements to increase ROM, strength, coordination, balance, and proprioception in order to improve patient's ability to progress to PLOF and address remaining functional goals.  (see flow sheet as applicable)     Details if applicable:     10  54724 Manual Therapy (timed):  decrease pain, increase ROM, increase tissue extensibility, and decrease trigger points to improve patient's ability to progress to PLOF and address remaining functional goals.  The manual therapy interventions were performed at a separate and distinct time from the therapeutic activities interventions . (see flow sheet as applicable)     Details if applicable:            Details if applicable:            Details if applicable:     40  [facility] Totals Reminder: bill using total billable min of TIMED therapeutic procedures (example: do not include dry needle or estim unattended, both untimed codes, in totals to left)  8-22 min = 1 unit; 23-37 min = 2 units; 38-52 min = 3 units; 53-67 min = 4 units; 68-82 min = 5 units   Total Total     [x]  Patient Education billed concurrently with other procedures   [x] Review HEP    [] Progressed/Changed HEP, detail:    [] Other detail:       Objective Information/Functional Measures/Assessment    Patient is progressing as expected with PT. Patient performed exercises, as per flow sheet, to further assist with increasing postural strength. Increased muscle fatigue was reported with TB exercises. Patient continues to have increased right shoulder pain was active ER. TTP present at left scalenes right biceps. Right shoulder PROM in WNL with pain at flexion and ER end range. Muscle spasms decreased following manual therapy. Decreased pain was also reported at the end of today's session. Will continue to progress patient as tolerated and able.     Patient will continue to benefit from

## 2024-03-06 ENCOUNTER — HOSPITAL ENCOUNTER (OUTPATIENT)
Facility: HOSPITAL | Age: 59
Setting detail: RECURRING SERIES
Discharge: HOME | End: 2024-03-09
Payer: COMMERCIAL

## 2024-03-06 PROCEDURE — 97110 THERAPEUTIC EXERCISES: CPT

## 2024-03-06 PROCEDURE — 97530 THERAPEUTIC ACTIVITIES: CPT

## 2024-03-06 NOTE — PROGRESS NOTES
Physical Therapy Discharge Instructions      [facility] - [address], [address]  [place] [address] [phone] fax    Patient: Maggi Palacios  : 1965      Continue Home Exercise Program 1-2 times per day for 3 weeks, then decrease to 3 times per week      Continue with    [] Ice  as needed 1-2 times per day     [x] Heat           Follow up with MD:     [x] Upon completion of therapy     [] As needed      Recommendations:     [x]   Return to activity with home program    []   Return to activity with the following modifications:       []Post Rehab Program    []Join Independent aquatic program     []Return to/join local gym      Additional Comments: Please call office with any questions or concerns.       Pawan Stern, KISHORE 3/6/2024 4:31 PM   
for DC

## 2024-03-07 ENCOUNTER — APPOINTMENT (OUTPATIENT)
Facility: HOSPITAL | Age: 59
End: 2024-03-07
Payer: COMMERCIAL

## 2024-10-25 ENCOUNTER — HOSPITAL ENCOUNTER (OUTPATIENT)
Facility: HOSPITAL | Age: 59
Setting detail: RECURRING SERIES
Discharge: HOME | End: 2024-10-28
Payer: COMMERCIAL

## 2024-10-25 PROCEDURE — 97161 PT EVAL LOW COMPLEX 20 MIN: CPT

## 2024-10-25 NOTE — THERAPY EVALUATION
PHYSICAL / OCCUPATIONAL THERAPY - DAILY TREATMENT NOTE (updated )    Patient Name: Maggi Osborne Drain    Date: 10/25/2024    : 1965  Insurance: Payor: ROXY / Plan: [employer] / Product Type: *No Product type* /      Patient  verified yes     Visit #   Current / Total 1 8   Time   In / Out 255 240   Pain   In / Out 2 2   Subjective Functional Status/Changes:       TREATMENT AREA =  Left foot pain [M79.672]  Right foot pain [M79.671]    OBJECTIVE         45 min [x]Eval - untimed                    HEP issued as  part of eval        [x]  Patient Education billed concurrently with other procedures   [x] Review HEP    [] Progressed/Changed HEP, detail:    [] Other detail:       Objective Information/Functional Measures/Assessment    SUBJECTIVE  Pain Level (0-10 scale): 2  []constant [x]intermittent []improving []worsening [x]no change since onset  Worse standing and walking, it can vary      Better with nothing  Subjective functional status/changes:     PLOF: I all areas of ADLS and activities with Hx of FMPS, worked, tolerated household and community activities within tolerance. No AD,   Limitations to PLOF: pain   Mechanism of Injury: 6 months ago, insideous onset  Current symptoms/Complaints: 60 YO female diagnosed as above and with S/S consistent with above diagnosis with R>L foot and arch and heel pain, medial ankle region pain. C/O burning with standing/at rest  Previous Treatment/Compliance: MD, x-rays, medication,   PMHx/Surgical Hx: asthma, arthritis, FMPS,   Work Hx: full time teacher,   Living Situation: lives in a 2 story house, alone, dog  Pt Goals: stop the heel pain and burning.  Barriers: [x]pain []financial []time []transportation []other  Motivation: good  Substance use: [x]Alcohol []Tobacco []other:   FABQ Score: []low []elevate  Cognition: A & O x 3    Other:    OBJECTIVE/EXAMINATION  Domestic Life: work, household, community activities,  Activity/Recreational

## 2024-10-25 NOTE — THERAPY EVALUATION
[facility]  [address], [address], [place] [address]  Ph:[phone] Fx:[phone]  Plan of Care / Statement of Necessity for Physical Therapy Services     Patient Name: Maggi Palacios : 1965   Medical   Diagnosis: Left foot pain [M79.672]  Right foot pain [M79.671]  Treatment Diagnosis:  M79.671  RIGHT FOOT PAIN and M79.672  LEFT FOOT PAIN    Onset Date: MD order 10/21/24, 6 months onset     Referral Source: Sylvia Baird,* Start of Care (SOC): 10/25/2024   Prior Hospitalization: See medical history Provider #: [id number]   Prior Level of Function:  I all areas of ADLS and activities with Hx of FMPS, worked, tolerated household and community activities within tolerance. No AD,    Comorbidities:  Musculoskeletal disorders FMPS, asthma,      Assessment / key information:  60 YO female diagnosed as above and with S/S consistent with above diagnosis with R>L foot and arch and heel pain, medial ankle region pain. C/O burning with standing/at rest . ROM and Strength are WFL PTTP and STC R>L plantar fascia. Patient demonstrates the potential to make gains with improved ROM, strength, endurance/activity tolerance, functional LEFS  survey score baseline  and all within a reasonable time frame so as to increase their functional independence with ADLs and activities for carryover to  improved quality of life, tolerance to household and community activities and work demands. Patient requires skilled Physical Therapy so as to monitor their response to and modify their treatment plan accordingly. Patient appears to be an appropriate candidate for skilled outpatient Physical Therapy.       Not post operative    Evaluation Complexity:  History:  HIGH Complexity :3+ comorbidities / personal factors will impact the outcome/ POC ; Examination:  HIGH Complexity : 4+ Standardized tests and measures addressing body structure, function, activity limitation

## 2024-10-30 ENCOUNTER — TELEPHONE (OUTPATIENT)
Facility: HOSPITAL | Age: 59
End: 2024-10-30

## 2024-10-30 NOTE — TELEPHONE ENCOUNTER
LM on 10/30/24 at 11:42am to schedule f/u visits due to her insurance auth being approved for 6 visits expiring on 12/26/24. Schedule 5 more visits only.

## 2024-11-15 ENCOUNTER — HOSPITAL ENCOUNTER (OUTPATIENT)
Facility: HOSPITAL | Age: 59
Setting detail: RECURRING SERIES
Discharge: HOME | End: 2024-11-18
Payer: COMMERCIAL

## 2024-11-15 PROCEDURE — 97110 THERAPEUTIC EXERCISES: CPT

## 2024-11-15 PROCEDURE — 97530 THERAPEUTIC ACTIVITIES: CPT

## 2024-11-15 NOTE — PROGRESS NOTES
Received fax from pharmacy requesting refill on pts medication(s). Pt was last seen in office on 8/11/2023  and has a follow up scheduled for 11/15/2023. Will send request to  Anna [facility]  for authorization.     Requested Prescriptions     Pending Prescriptions Disp Refills    simvastatin (ZOCOR) 20 MG tablet [Pharmacy Med Name: SIMVASTATIN 20 MG Tablet] 90 tablet 10     Sig: TAKE 1 TABLET EVERY NIGHT    losartan (COZAAR) 25 MG tablet [Pharmacy Med Name: LOSARTAN POTASSIUM 25 MG Tablet] 90 tablet 10     Sig: TAKE 1 TABLET EVERY DAY      
functional movements to increase ROM, strength, coordination, balance, and proprioception in order to improve patient's ability to progress to PLOF and address remaining functional goals.  (see flow sheet as applicable)     Details if applicable:            Details if applicable:            Details if applicable:            Details if applicable:     34  [facility] Totals Reminder: bill using total billable min of TIMED therapeutic procedures (example: do not include dry needle or estim unattended, both untimed codes, in totals to left)  8-22 min = 1 unit; 23-37 min = 2 units; 38-52 min = 3 units; 53-67 min = 4 units; 68-82 min = 5 units   Total Total     [x]  Patient Education billed concurrently with other procedures   [x] Review HEP    [] Progressed/Changed HEP, detail:    [] Other detail:       Objective Information/Functional Measures/Assessment  Pt reported her feet feels good today maybe the new shoes she is wearing. Pt responded well to each strengthening there ex.  Pt experienced minimal pain at right heel during towel stretch. Pt reports burning at (B) heel  right>left. Pt experienced  minimal pain at right heel.      Patient will continue to benefit from skilled PT / OT services to modify and progress therapeutic interventions, analyze and address functional mobility deficits, analyze and address ROM deficits, analyze and address strength deficits, analyze and address soft tissue restrictions, analyze and cue for proper movement patterns, analyze and modify for postural abnormalities, analyze and address imbalance/dizziness, and instruct in home and community integration to address functional deficits and attain remaining goals.    Progress toward goals / Updated goals:  []  See Progress Note/Recertification  1 patient will have established and be I with HEP to aid with independence and self management at discharge  EVAL HEP issued at evaluation  Current:  1x day  11/15/24  2 patient will have pain 1/10 to aid

## 2024-11-18 ENCOUNTER — HOSPITAL ENCOUNTER (OUTPATIENT)
Facility: HOSPITAL | Age: 59
Setting detail: RECURRING SERIES
Discharge: HOME | End: 2024-11-21
Payer: COMMERCIAL

## 2024-11-18 PROCEDURE — 97110 THERAPEUTIC EXERCISES: CPT

## 2024-11-18 PROCEDURE — 97530 THERAPEUTIC ACTIVITIES: CPT

## 2024-11-18 PROCEDURE — 97535 SELF CARE MNGMENT TRAINING: CPT

## 2024-11-18 NOTE — PROGRESS NOTES
replicating functional movements to increase ROM, strength, coordination, balance, and proprioception in order to improve patient's ability to progress to PLOF and address remaining functional goals.  (see flow sheet as applicable)     Details if applicable:     8  89680 Self Care/Home Management (timed):  improve patient knowledge and understanding of positioning, posture/ergonomics, activity modification, and using MH and perform stretches to decreased tightness and pain  to improve patient's ability to progress to PLOF and address remaining functional goals.  (see flow sheet as applicable)     Details if applicable:            Details if applicable:            Details if applicable:     48  [facility] Totals Reminder: bill using total billable min of TIMED therapeutic procedures (example: do not include dry needle or estim unattended, both untimed codes, in totals to left)  8-22 min = 1 unit; 23-37 min = 2 units; 38-52 min = 3 units; 53-67 min = 4 units; 68-82 min = 5 units   Total Total     [x]  Patient Education billed concurrently with other procedures   [x] Review HEP    [] Progressed/Changed HEP, detail:    [] Other detail:       Objective Information/Functional Measures/Assessment  Pt completed each strengthening and flexibility there ex for (B) ankle with minimal cue son performing ankle 4 way with YTB with correct form.  Pt experienced minimal discomfort with US on lateral aspect of right heel but was able to tolerate. Pt is currently ambulating with good  step length. Pt benefited with the use of MH and US due to decreased pain.      Patient will continue to benefit from skilled PT / OT services to modify and progress therapeutic interventions, analyze and address functional mobility deficits, analyze and address ROM deficits, analyze and address strength deficits, analyze and address soft tissue restrictions, analyze and cue for proper movement patterns, analyze and modify for postural abnormalities, analyze

## 2024-11-20 ENCOUNTER — HOSPITAL ENCOUNTER (OUTPATIENT)
Facility: HOSPITAL | Age: 59
Setting detail: RECURRING SERIES
Discharge: HOME | End: 2024-11-23
Payer: COMMERCIAL

## 2024-11-20 PROCEDURE — 97530 THERAPEUTIC ACTIVITIES: CPT

## 2024-11-20 PROCEDURE — 97110 THERAPEUTIC EXERCISES: CPT

## 2024-11-20 PROCEDURE — 97112 NEUROMUSCULAR REEDUCATION: CPT

## 2024-11-20 NOTE — PROGRESS NOTES
address soft tissue restrictions, analyze and cue for proper movement patterns, analyze and address imbalance/dizziness, and instruct in home and community integration to address functional deficits and attain remaining goals.    Progress toward goals / Updated goals:  []  See Progress Note/Recertification    1 patient will have established and be I with HEP to aid with independence and self management at discharge  EVAL HEP issued at evaluation  Current:  1x day  24    2 patient will have pain 1/10 to aid with increase tolerance to ADLS and regular daily activities at home and in the community  EVAL 2  Current:  arrival pain 4-5/10  right heel  24                 Long Term Goals: To be accomplished in 4 WEEKS  1 patient will have established and be I with HEP to aid with independence and self management at discharge  EVAL HEP issued at evaluation    2 patient will have LEFS 65 to aid with increase tolerance to ADLS and regular daily activities at home and in the community  EVAL 59  Current: Ongoing, will assess at next PN. 2024    3 patient will report overall 50% improvement to aid with increase tolerance to standing and walking at work   EVAL pain worse with standing   Current: Ongoing, will assess at next PN. 2024    4 patient will report little to no difficulty with her regular daily light activities    EVAL moderate difficulty    Next PN/ RC due 2024  Auth due (visit number/ date) 6 visits  2024    PLAN  - Continue Plan of Care  - Upgrade activities as tolerated    Pawan Stern PTA    2024    10:43 AM  If an interpreting service was utilized for treatment of this patient, the contents of this document represent the material reviewed with the patient via the .     Future Appointments   Date Time Provider Department Center   2024  3:30 PM Pawan Stern PTA [facility]   2024  3:30 PM [facility]   2024  3:30

## 2024-11-25 ENCOUNTER — HOSPITAL ENCOUNTER (OUTPATIENT)
Facility: HOSPITAL | Age: 59
Setting detail: RECURRING SERIES
Discharge: HOME | End: 2024-11-28
Payer: COMMERCIAL

## 2024-11-25 PROCEDURE — 97530 THERAPEUTIC ACTIVITIES: CPT

## 2024-11-25 PROCEDURE — 97110 THERAPEUTIC EXERCISES: CPT

## 2024-11-25 NOTE — THERAPY RECERTIFICATION
[facility]  [address], [address], [place] [address]  Ph:[phone] Fx:[phone]  PHYSICAL THERAPY PROGRESS NOTE  Patient Name: Maggi Palacios : 1965   Treatment/Medical Diagnosis: Pain in right foot [M79.671]  Pain in left foot [M79.672]   Referral Source: Sylvia Baird,*     Date of Initial Visit: 10/25/2024  Attended Visits: 5 Missed Visits: 0     SUMMARY OF TREATMENT  PT intervention has consisted of therapeutic exercise, functional activities, neuromuscular re-education and HEP to improve B foot pain to improve patient's ability to perform job related tasks and ADL's with ease. MHP utilized for pain management.      CURRENT STATUS    Patient has attended 5 total PT sessions and making slow progress towards PT goals. Patient reports improved B foot flexibility since the start of PT. Patient reports having constant foot pain more so on the right foot when compared to the left foot. Patient explains that prolonged standing, prolonged walking and stair negotiation hurts the worse. Patient will continue to benefit from skilled PT services to modify and progress therapeutic interventions, analyze and address functional mobility deficits, analyze and address strength deficits, analyze and address soft tissue restrictions, analyze and cue for proper movement patterns, analyze and address imbalance/dizziness, and instruct in home and community integration to address functional deficits and attain remaining goals.     1 patient will have established and be I with HEP to aid with independence and self management at discharge  EVAL HEP issued at evaluation  Current: Patient reports compliance everyday. MET      2 patient will have pain 1/10 to aid with increase tolerance to ADLS and regular daily activities at home and in the community  EVAL 2  Current: Patient reports 5-6/10 pain today and 4-5/10 pain with ADL's. Progressing

## 2024-12-02 ENCOUNTER — HOSPITAL ENCOUNTER (OUTPATIENT)
Facility: HOSPITAL | Age: 59
Setting detail: RECURRING SERIES
Discharge: HOME | End: 2024-12-05
Payer: COMMERCIAL

## 2024-12-02 PROCEDURE — 97530 THERAPEUTIC ACTIVITIES: CPT

## 2024-12-02 PROCEDURE — 97110 THERAPEUTIC EXERCISES: CPT

## 2024-12-02 PROCEDURE — 97112 NEUROMUSCULAR REEDUCATION: CPT

## 2024-12-02 NOTE — PROGRESS NOTES
PHYSICAL / OCCUPATIONAL THERAPY - DAILY TREATMENT NOTE    Patient Name: Maggi Osborne Drain    Date: 2024    : 1965  Insurance: Payor: MEAGAN RAMSEY / Plan: [employer] / Product Type: *No Product type* /      Patient  verified Yes     Visit #   Current / Total 1 8   Time   In / Out 340 435   Pain   In / Out 7 <7   Subjective Functional Status/Changes: Not doing too good today. All weekend it was sore. Pain today is 7/10 R>L.      TREATMENT AREA =  Pain in right foot [M79.671]  Pain in left foot [M79.672]     OBJECTIVE    Modalities Rationale:     decrease pain and increase tissue extensibility to improve patient's ability to progress to PLOF and address remaining functional goals.     min [] Estim Unattended, type/location:                                      []  w/ice    []  w/heat    min [] Estim Attended, type/location:                                     []  w/US     []  w/ice    []  w/heat    []  TENS insruct      min []  Mechanical Traction: type/lbs                   []  pro   []  sup   []  int   []  cont    []  before manual    []  after manual    min []  Ultrasound, settings/location:     10 min  unbill []  Ice     [x]  Heat  post   location/position: reclined B feet and heels     min []  Paraffin,  details:     min []  Vasopneumatic Device, press/temp:     min []  Whirlpool / Fluido:    If using vaso (only need to measure limb vaso being performed on)      pre-treatment girth :       post-treatment girth :       measured at (landmark location) :      min []  Other:    Skin assessment post-treatment:   Intact      Therapeutic Procedures:    Tx Min Billable or 1:1 Min (if diff from Tx Min) Procedure, Rationale, Specifics   [phone] [id number] Therapeutic Exercise (timed):  increase ROM, strength, coordination, balance, and proprioception to improve patient's ability to progress to PLOF and address remaining functional goals. (see flow sheet as applicable)     Details if applicable:

## 2024-12-04 ENCOUNTER — TELEPHONE (OUTPATIENT)
Facility: HOSPITAL | Age: 59
End: 2024-12-04

## 2024-12-04 NOTE — TELEPHONE ENCOUNTER
JLUIS on 12/4/24 at 10:34am to schedule f/u visits due her insurance auth being approved for 2 visits expiring on 1/1/25.

## 2025-06-19 ENCOUNTER — HOSPITAL ENCOUNTER (OUTPATIENT)
Facility: HOSPITAL | Age: 60
Setting detail: RECURRING SERIES
Discharge: HOME | End: 2025-06-22
Payer: COMMERCIAL

## 2025-06-19 PROCEDURE — 97162 PT EVAL MOD COMPLEX 30 MIN: CPT

## 2025-06-19 NOTE — PROGRESS NOTES
PHYSICAL / OCCUPATIONAL THERAPY - DAILY TREATMENT NOTE (updated 3/2025)  For Eval visit    Patient Name: Maggi Palacios    Date: 2025    : 1965  Insurance: Payor: MEAGAN RAMSEY / Plan: [employer] / Product Type: *No Product type* /      Patient  verified yes     Visit #   Current / Total 1 12   Time   In / Out 4:20 pm 4:50 pm   Pain   In / Out 3 4   Subjective Functional Status/Changes: See POC     TREATMENT AREA =  see POC    OBJECTIVE      24 min   Eval - untimed                      Therapeutic Procedures:    Tx Min Billable or 1:1 Min (if diff from Tx Min) Procedure, Rationale, Specifics         6  33659 Therapeutic Activity (timed):  use of dynamic activities replicating functional movements to increase ROM, strength, coordination, balance, and proprioception in order to improve patient's ability to progress to PLOF and address remaining functional goals.  (see flow sheet as applicable)     Details if applicable:  HEP program            Details if applicable:            Details if applicable:            Details if applicable:     6   [facility] Totals Reminder: bill using total billable min of TIMED therapeutic procedures (example: do not include dry needle or estim unattended, both untimed codes, in totals to left)  8-22 min = 1 unit; 23-37 min = 2 units; 38-52 min = 3 units; 53-67 min = 4 units; 68-82 min = 5 units   Total Total     Charge Capture    [x]  Patient Education billed concurrently with other procedures   [x] Review HEP    [] Progressed/Changed HEP, detail:    [] Other detail:       Objective Information/Functional Measures/Assessment    See POC    Patient will continue to benefit from skilled PT / OT services to modify and progress therapeutic interventions, analyze and address functional mobility deficits, analyze and address ROM deficits, analyze and address strength deficits, analyze and address soft tissue restrictions, analyze and cue for proper movement patterns,

## 2025-06-19 NOTE — THERAPY EVALUATION
[facility]  [address], [address], [place] [address]  Ph:[phone] Fx:[phone]  Plan of Care / Statement of Necessity for Physical Therapy Services     Patient Name: Maggi Palacios : 1965   Medical   Diagnosis: Cervical neuritis  DDD (degenerative disc disease), cervical Treatment Diagnosis:  M54.2  NECK PAIN   Onset Date:      Referral Source: Peggy Willard APRN -* Start of Care (SOC): 2025   Prior Hospitalization: See medical history Provider #: [id number]   Prior Level of Function: Independent with ADLs and IADLs; works as a teacher full time   Comorbidities:  Respiratory disorders, Musculoskeletal disorders, and Other: fibromyalgia     Not post operative, standard auth procedure    Evaluation Complexity:  History:  MEDIUM  Complexity : 1-2 comorbidities / personal factors will impact the outcome/ POC ; Examination:  MEDIUM Complexity : 3 Standardized tests and measures addressin body structure, function, activity limitation and / or participation in recreation  ;Presentation:  MEDIUM Complexity : Evolving with changing characteristics  ;Clinical Decision Making: Neck Disability Index (NDI) = 32 % ; (30% - 48% Moderate Disability) = MODERATE Complexity  Overall Complexity Rating: MEDIUM  Problem List: pain affecting function, decrease ROM, decrease strength, decrease ADL/functional abilities, decrease activity tolerance, and decrease flexibility/joint mobility   Treatment Plan may include any combination of the followin Therapeutic Exercise, 87191 Neuromuscular Re-Education, 35363 Manual Therapy, 55150 Therapeutic Activity, 78560 Self Care/Home Management, 34343 Electrical Stim unattended /  (MCR), 16710 Electrical Stim attended, 59607 Mechanical Traction, and (Elective Self Pay) Needle Insertion w/o Injection (1 or 2 muscles), (3+ muscles)  Patient / Family readiness to learn indicated by: asking questions, trying

## 2025-06-26 ENCOUNTER — TELEPHONE (OUTPATIENT)
Facility: HOSPITAL | Age: 60
End: 2025-06-26

## 2025-06-26 NOTE — TELEPHONE ENCOUNTER
JLUIS on 6/26/25 at 1:47pm to schedule f/u visits due to her insurance auth being approved for 6 visits expiring on 8/17/25. 1-2 times a week.

## 2025-07-09 ENCOUNTER — TELEPHONE (OUTPATIENT)
Facility: HOSPITAL | Age: 60
End: 2025-07-09

## (undated) DEVICE — DRAPE SURG L39XW46IN PERI OB

## (undated) DEVICE — NEEDLE SPNL 22GAX3 1 2IN

## (undated) DEVICE — LAPAROSCOPIC SMOKE FILTRATION SYSTEM: Brand: PALL LAPAROSHIELD® PLUS LAPAROSCOPIC SMOKE FILTRATION SYSTEM

## (undated) DEVICE — DEVICE SUT VLT PRELD W/ POLYSRB 2-0 L48IN SUT DISP FOR LAP

## (undated) DEVICE — LEGGINGS, PAIR, 31X48, STERILE: Brand: MEDLINE

## (undated) DEVICE — 3M™ STERI-STRIP™ COMPOUND BENZOIN TINCTURE 40 BAGS/CARTON 4 CARTONS/CASE C1544: Brand: 3M™ STERI-STRIP™

## (undated) DEVICE — (D)GLOVE SURG TRIFLX 6.5 PWD L -- DISC BY MFR USE ITEM 302991

## (undated) DEVICE — CORDLESS ULTRASONIC DISSECTOR: Brand: SONICISION

## (undated) DEVICE — TRAY CATH OD16FR SIL URIN M STATLOK STBL DEV SURSTP

## (undated) DEVICE — ELECTROSURGICAL DEVICE HOLSTER;FOR USE WITH MAXIMUM PEAK VOLTAGE OF 4000 V: Brand: FORCE TRIVERSE

## (undated) DEVICE — COVER LT HNDL FLX

## (undated) DEVICE — FLEX ADVANTAGE 3000CC: Brand: FLEX ADVANTAGE

## (undated) DEVICE — ELECTRODE ES 36CM LAP FLAT L HK COAT DISP CLEANCOAT

## (undated) DEVICE — Device

## (undated) DEVICE — SUTURING DEVICE: Brand: ENDO STITCH

## (undated) DEVICE — SUTURE SZ 0 27IN 5/8 CIR UR-6  TAPER PT VIOLET ABSRB VICRYL J603H

## (undated) DEVICE — SOLUTION IV 1000ML 0.9% SOD CHL

## (undated) DEVICE — (D)STRIP SKN CLSR 0.5X4IN WHT --

## (undated) DEVICE — REM POLYHESIVE ADULT PATIENT RETURN ELECTRODE: Brand: VALLEYLAB

## (undated) DEVICE — TK® QUICK LOAD® UNIT: Brand: TK® QUICK LOAD®

## (undated) DEVICE — KIT CLN UP BON SECOURS MARYV

## (undated) DEVICE — SOLUTION IRRIG 1000ML LAC RINGER PLAS POUR BTL

## (undated) DEVICE — MATERNITY PAD,HEAVY: Brand: CURITY

## (undated) DEVICE — MARYLAND JAW LAPAROSCOPIC SEALER/DIVIDER: Brand: LIGASURE

## (undated) DEVICE — TK® TI-KNOT® DEVICE: Brand: TK® TI-KNOT®

## (undated) DEVICE — THIS PRODUCT IS SINGLE USE AND INTENDED TO BE USED FOR BLUNT DISSECTION OF TISSUE.: Brand: ASPEN® ENDOSCOPIC KITTNER, SINGLE TIP

## (undated) DEVICE — INSTRMT SET WND CLSR SUT PASS --

## (undated) DEVICE — INTENDED FOR TISSUE SEPARATION, AND OTHER PROCEDURES THAT REQUIRE A SHARP SURGICAL BLADE TO PUNCTURE OR CUT.: Brand: BARD-PARKER SAFETY BLADES SIZE 15, STERILE

## (undated) DEVICE — INSUFFLATION NEEDLE: Brand: SURGINEEDLE

## (undated) DEVICE — NEEDLE SPNL 22GA L3.5IN BLK WHTACR PNCL PNT HI FLO DISP

## (undated) DEVICE — 3M™ BAIR PAWS FLEX™ WARMING GOWN, STANDARD, 20 PER CASE 81003: Brand: BAIR PAWS™

## (undated) DEVICE — DRAPE,UNDERBUTTOCKS,PCH,STERILE: Brand: MEDLINE

## (undated) DEVICE — BLLN OCCL PERITONM COLPOPNEUMO --

## (undated) DEVICE — TIP MANIP UTER RUMI 6.7MMX6CM --

## (undated) DEVICE — (D)PREP SKN CHLRAPRP APPL 26ML -- CONVERT TO ITEM 371833

## (undated) DEVICE — TROCAR: Brand: KII FIOS FIRST ENTRY

## (undated) DEVICE — (D)SYR 10ML 1/5ML GRAD NSAF -- PKGING CHANGE USE ITEM 338027

## (undated) DEVICE — BLADELESS OPTICAL TROCAR WITH FIXATION CANNULA: Brand: VERSAONE

## (undated) DEVICE — SCISSORS RMFG LAPAROSC 5MM -- LAWSON OEM ITEM 112765

## (undated) DEVICE — (D)GLOVE SURG TRIFLX 6 PWD LTX -- DISC BY MFR USE ITEM 302989

## (undated) DEVICE — KENDALL SCD EXPRESS SLEEVES, KNEE LENGTH, MEDIUM: Brand: KENDALL SCD

## (undated) DEVICE — TELFA NON-ADHERENT ABSORBENT DRESSING: Brand: TELFA

## (undated) DEVICE — RD® QUICK LOAD® SURGICAL SUTURE, 2-0 STRONGABSORB®, ABSORBABLE, 53", VIOLET, BRAIDED: Brand: RD® QUICK LOAD®

## (undated) DEVICE — DRAPE TWL SURG 16X26IN BLU ORB04] ALLCARE INC]

## (undated) DEVICE — TABLE COVER: Brand: CONVERTORS

## (undated) DEVICE — TRAY PREP DRY W/ PREM GLV 2 APPL 6 SPNG 2 UNDPD 1 OVERWRAP

## (undated) DEVICE — SUTURE MCRYL SZ 4-0 L18IN ABSRB UD L19MM PS-2 3/8 CIR PRIM Y496G